# Patient Record
Sex: FEMALE | Race: WHITE | NOT HISPANIC OR LATINO | Employment: FULL TIME | URBAN - METROPOLITAN AREA
[De-identification: names, ages, dates, MRNs, and addresses within clinical notes are randomized per-mention and may not be internally consistent; named-entity substitution may affect disease eponyms.]

---

## 2017-10-30 ENCOUNTER — OFFICE VISIT (OUTPATIENT)
Dept: OBSTETRICS AND GYNECOLOGY | Facility: CLINIC | Age: 37
End: 2017-10-30
Attending: OBSTETRICS & GYNECOLOGY
Payer: COMMERCIAL

## 2017-10-30 VITALS
BODY MASS INDEX: 23.72 KG/M2 | SYSTOLIC BLOOD PRESSURE: 118 MMHG | HEIGHT: 62 IN | DIASTOLIC BLOOD PRESSURE: 76 MMHG | WEIGHT: 128.88 LBS

## 2017-10-30 DIAGNOSIS — Z11.51 SCREENING FOR HPV (HUMAN PAPILLOMAVIRUS): ICD-10-CM

## 2017-10-30 DIAGNOSIS — Z01.419 ENCOUNTER FOR GYNECOLOGICAL EXAMINATION: Primary | ICD-10-CM

## 2017-10-30 DIAGNOSIS — Z12.4 SCREENING FOR CERVICAL CANCER: ICD-10-CM

## 2017-10-30 PROCEDURE — 99999 PR PBB SHADOW E&M-NEW PATIENT-LVL II: CPT | Mod: PBBFAC,,, | Performed by: OBSTETRICS & GYNECOLOGY

## 2017-10-30 PROCEDURE — 99395 PREV VISIT EST AGE 18-39: CPT | Mod: S$GLB,,, | Performed by: OBSTETRICS & GYNECOLOGY

## 2017-10-30 PROCEDURE — 88175 CYTOPATH C/V AUTO FLUID REDO: CPT

## 2017-10-30 PROCEDURE — 87624 HPV HI-RISK TYP POOLED RSLT: CPT

## 2017-10-30 NOTE — PROGRESS NOTES
Subjective:       Patient ID: Becka Carlson is a 37 y.o. female.    Chief Complaint:  Annual Exam (pap 2015 per pt normal )      History of Present Illness.  Becka Carlson is a 37 y.o. female.  She has no breast or urinary symptoms.  She has no menorrhagia, oligomenorrhea, bleeding betweeen menses, postcoital bleeding, dysmenorrhea, pelvic pain, dyspareunia, vaginal dryness, vaginal discharge, or sexual complaints.  She is sexually active and declines STD testing.    GYN & OB History  Patient's last menstrual period was 10/06/2017 (exact date).   Date of Last Pap: No result found      OB History    Para Term  AB Living   0 0 0 0 0 0   SAB TAB Ectopic Multiple Live Births   0 0 0 0 0             Past Medical History:   Diagnosis Date    Anemia      Past Surgical History:   Procedure Laterality Date    TONSILLECTOMY       Family History   Problem Relation Age of Onset    Hypertension Father     Hyperlipidemia Father     Diabetes Father     Stroke Father     Coronary artery disease Father     Heart attack Father     Prostate cancer Father     Hyperlipidemia Mother     Coronary artery disease Paternal Grandmother     Cancer Maternal Grandmother      - lung, smoker    Coronary artery disease Maternal Grandfather     Breast cancer Neg Hx     Colon cancer Neg Hx     Ovarian cancer Neg Hx      Social History   Substance Use Topics    Smoking status: Never Smoker    Smokeless tobacco: Never Used    Alcohol use Yes      Comment: social        Current Outpatient Prescriptions:     multivitamin capsule, Take 1 capsule by mouth once daily., Disp: , Rfl:     Review of patient's allergies indicates:  Allergies not on file    Review of Systems  Review of Systems   Constitutional: Negative for fatigue.   HENT: Negative for trouble swallowing.    Eyes: Negative for visual disturbance.   Respiratory: Negative for cough and shortness of breath.    Cardiovascular: Negative for chest pain.  "  Gastrointestinal: Negative for abdominal distention, abdominal pain, blood in stool, nausea and vomiting.   Genitourinary: Negative for difficulty urinating, dyspareunia, dysuria, flank pain, frequency, hematuria, pelvic pain, urgency, vaginal bleeding, vaginal discharge and vaginal pain.   Musculoskeletal: Negative for arthralgias.   Skin: Negative for rash.   Neurological: Negative for dizziness and headaches.   Psychiatric/Behavioral: Negative for sleep disturbance. The patient is not nervous/anxious.         Objective:     Vitals:    10/30/17 1339   BP: 118/76   Weight: 58.4 kg (128 lb 13.7 oz)   Height: 5' 2" (1.575 m)   PainSc: 0-No pain     Body mass index is 23.57 kg/m².      Physical Exam:   Constitutional: She is oriented to person, place, and time. Vital signs are normal. She appears well-developed and well-nourished.    HENT:   Head: Normocephalic.     Neck: Normal range of motion. No thyromegaly present.     Pulmonary/Chest: Right breast exhibits no mass, no nipple discharge, no skin change, no tenderness and no swelling. Left breast exhibits no mass, no nipple discharge, no skin change, no tenderness and no swelling. Breasts are symmetrical.        Abdominal: Soft. Normal appearance and bowel sounds are normal. She exhibits no distension. There is no tenderness.     Genitourinary: Vagina normal and uterus normal. Pelvic exam was performed with patient supine. There is no rash, tenderness, lesion or injury on the right labia. There is no rash, tenderness, lesion or injury on the left labia. Cervix is normal. Right adnexum displays no mass, no tenderness and no fullness. Left adnexum displays no mass, no tenderness and no fullness. No erythema in the vagina. No vaginal discharge found. Cervix exhibits no motion tenderness and no discharge.           Musculoskeletal: Normal range of motion.      Lymphadenopathy:        Right: No inguinal and no supraclavicular adenopathy present.        Left: No " inguinal and no supraclavicular adenopathy present.    Neurological: She is alert and oriented to person, place, and time.    Skin: Skin is warm and dry.    Psychiatric: She has a normal mood and affect.        Assessment/ Plan:     Encounter for gynecological examination    Screening for cervical cancer  -     Liquid-based pap smear, screening    Screening for HPV (human papillomavirus)  -     HPV DNA probe, amplified        Routine pap smears.  Self breast exam  Diet and exercise discussed.  Contraception reviewed/discussed.  STD testing discussed and declined.  Follow-up with me in 1 year.

## 2017-11-02 ENCOUNTER — TELEPHONE (OUTPATIENT)
Dept: OBSTETRICS AND GYNECOLOGY | Facility: CLINIC | Age: 37
End: 2017-11-02

## 2017-11-02 LAB
HPV16 AG SPEC QL: NEGATIVE
HPV16+18+H RISK 12 DNA CVX-IMP: NEGATIVE
HPV18 DNA SPEC QL NAA+PROBE: NEGATIVE

## 2017-11-02 NOTE — TELEPHONE ENCOUNTER
----- Message from Nikki Lafleur MD sent at 11/2/2017  3:06 PM CDT -----  Call patient and tell her that her Pap smear is normal and I will see her in 1 year for her annual exam.

## 2019-04-04 ENCOUNTER — OFFICE VISIT (OUTPATIENT)
Dept: OBSTETRICS AND GYNECOLOGY | Facility: CLINIC | Age: 39
End: 2019-04-04
Payer: COMMERCIAL

## 2019-04-04 VITALS
HEIGHT: 63 IN | DIASTOLIC BLOOD PRESSURE: 74 MMHG | SYSTOLIC BLOOD PRESSURE: 108 MMHG | WEIGHT: 121.38 LBS | BODY MASS INDEX: 21.51 KG/M2

## 2019-04-04 DIAGNOSIS — Z01.419 ENCOUNTER FOR GYNECOLOGICAL EXAMINATION: Primary | ICD-10-CM

## 2019-04-04 PROCEDURE — 99395 PR PREVENTIVE VISIT,EST,18-39: ICD-10-PCS | Mod: S$GLB,,, | Performed by: OBSTETRICS & GYNECOLOGY

## 2019-04-04 PROCEDURE — 99395 PREV VISIT EST AGE 18-39: CPT | Mod: S$GLB,,, | Performed by: OBSTETRICS & GYNECOLOGY

## 2019-04-04 PROCEDURE — 99999 PR PBB SHADOW E&M-EST. PATIENT-LVL III: CPT | Mod: PBBFAC,,, | Performed by: OBSTETRICS & GYNECOLOGY

## 2019-04-04 PROCEDURE — 99999 PR PBB SHADOW E&M-EST. PATIENT-LVL III: ICD-10-PCS | Mod: PBBFAC,,, | Performed by: OBSTETRICS & GYNECOLOGY

## 2019-04-04 RX ORDER — IVERMECTIN 10 MG/G
CREAM TOPICAL
COMMUNITY

## 2019-04-04 NOTE — PROGRESS NOTES
Subjective:       Patient ID: Becka Carlson is a 38 y.o. female.    Chief Complaint:  No chief complaint on file.      History of Present Illness.  Becka Carlson is a 38 y.o. female.  She has no breast or urinary symptoms.  She has no menorrhagia, oligomenorrhea, bleeding betweeen menses, postcoital bleeding, dysmenorrhea, pelvic pain, dyspareunia, vaginal dryness, vaginal discharge, or sexual complaints.    GYN & OB History  No LMP recorded.   Date of Last Pap: 2017  Normal HPV negative    OB History    Para Term  AB Living   0 0 0 0 0 0   SAB TAB Ectopic Multiple Live Births   0 0 0 0 0       Past Medical History:   Diagnosis Date    Anemia      Past Surgical History:   Procedure Laterality Date    TONSILLECTOMY       Family History   Problem Relation Age of Onset    Hypertension Father     Hyperlipidemia Father     Diabetes Father     Stroke Father     Coronary artery disease Father     Heart attack Father     Prostate cancer Father     Hyperlipidemia Mother     Coronary artery disease Paternal Grandmother     Cancer Maternal Grandmother         - lung, smoker    Coronary artery disease Maternal Grandfather     Breast cancer Neg Hx     Colon cancer Neg Hx     Ovarian cancer Neg Hx      Social History     Tobacco Use    Smoking status: Never Smoker    Smokeless tobacco: Never Used   Substance Use Topics    Alcohol use: Yes     Comment: social     Drug use: No       Current Outpatient Medications:     multivitamin capsule, Take 1 capsule by mouth once daily., Disp: , Rfl:     Review of patient's allergies indicates:  No Known Allergies    Review of Systems  Review of Systems   Constitutional: Negative for fatigue.   HENT: Negative for trouble swallowing.    Eyes: Negative for visual disturbance.   Respiratory: Negative for cough and shortness of breath.    Cardiovascular: Negative for chest pain.   Gastrointestinal: Negative for abdominal distention, abdominal  pain, blood in stool, nausea and vomiting.   Genitourinary: Negative for difficulty urinating, dyspareunia, dysuria, flank pain, frequency, hematuria, pelvic pain, urgency, vaginal bleeding, vaginal discharge and vaginal pain.   Musculoskeletal: Negative for arthralgias.   Skin: Negative for rash.   Neurological: Negative for dizziness and headaches.   Psychiatric/Behavioral: Negative for sleep disturbance. The patient is not nervous/anxious.         Objective:   There were no vitals filed for this visit.  There is no height or weight on file to calculate BMI.      Physical Exam:   Constitutional: She is oriented to person, place, and time. Vital signs are normal. She appears well-developed and well-nourished.    HENT:   Head: Normocephalic.     Neck: Normal range of motion. No thyromegaly present.     Pulmonary/Chest: Right breast exhibits no mass, no nipple discharge, no skin change, no tenderness and no swelling. Left breast exhibits no mass, no nipple discharge, no skin change, no tenderness and no swelling. Breasts are symmetrical.        Abdominal: Soft. Normal appearance and bowel sounds are normal. She exhibits no distension. There is no tenderness.     Genitourinary: Vagina normal and uterus normal. Pelvic exam was performed with patient supine. There is no rash, tenderness, lesion or injury on the right labia. There is no rash, tenderness, lesion or injury on the left labia. Cervix is normal. Right adnexum displays no mass, no tenderness and no fullness. Left adnexum displays no mass, no tenderness and no fullness. No erythema in the vagina. No vaginal discharge found. Cervix exhibits no motion tenderness and no discharge.           Musculoskeletal: Normal range of motion.      Lymphadenopathy:        Right: No inguinal and no supraclavicular adenopathy present.        Left: No inguinal and no supraclavicular adenopathy present.    Neurological: She is alert and oriented to person, place, and time.     Skin: Skin is warm and dry.    Psychiatric: She has a normal mood and affect.        Assessment/ Plan:     Encounter for gynecological examination        Routine pap smears.  Self breast exam  Diet and exercise discussed.  Contraception reviewed/discussed.  Follow-up with me in 1 year.

## 2021-04-12 ENCOUNTER — PATIENT MESSAGE (OUTPATIENT)
Dept: OBSTETRICS AND GYNECOLOGY | Facility: CLINIC | Age: 41
End: 2021-04-12

## 2021-04-12 ENCOUNTER — OFFICE VISIT (OUTPATIENT)
Dept: OBSTETRICS AND GYNECOLOGY | Facility: CLINIC | Age: 41
End: 2021-04-12
Attending: OBSTETRICS & GYNECOLOGY
Payer: COMMERCIAL

## 2021-04-12 VITALS
SYSTOLIC BLOOD PRESSURE: 112 MMHG | DIASTOLIC BLOOD PRESSURE: 72 MMHG | HEIGHT: 63 IN | WEIGHT: 123.13 LBS | BODY MASS INDEX: 21.82 KG/M2

## 2021-04-12 DIAGNOSIS — Z12.31 ENCOUNTER FOR SCREENING MAMMOGRAM FOR BREAST CANCER: ICD-10-CM

## 2021-04-12 DIAGNOSIS — Z01.419 ENCOUNTER FOR GYNECOLOGICAL EXAMINATION: ICD-10-CM

## 2021-04-12 DIAGNOSIS — Z11.51 SCREENING FOR HUMAN PAPILLOMAVIRUS (HPV): ICD-10-CM

## 2021-04-12 DIAGNOSIS — Z12.4 SCREENING FOR MALIGNANT NEOPLASM OF THE CERVIX: Primary | ICD-10-CM

## 2021-04-12 PROCEDURE — 87624 HPV HI-RISK TYP POOLED RSLT: CPT | Performed by: OBSTETRICS & GYNECOLOGY

## 2021-04-12 PROCEDURE — 88175 CYTOPATH C/V AUTO FLUID REDO: CPT | Performed by: OBSTETRICS & GYNECOLOGY

## 2021-04-12 PROCEDURE — 1126F PR PAIN SEVERITY QUANTIFIED, NO PAIN PRESENT: ICD-10-PCS | Mod: S$GLB,,, | Performed by: OBSTETRICS & GYNECOLOGY

## 2021-04-12 PROCEDURE — 1126F AMNT PAIN NOTED NONE PRSNT: CPT | Mod: S$GLB,,, | Performed by: OBSTETRICS & GYNECOLOGY

## 2021-04-12 PROCEDURE — 99396 PREV VISIT EST AGE 40-64: CPT | Mod: S$GLB,,, | Performed by: OBSTETRICS & GYNECOLOGY

## 2021-04-12 PROCEDURE — 3008F PR BODY MASS INDEX (BMI) DOCUMENTED: ICD-10-PCS | Mod: CPTII,S$GLB,, | Performed by: OBSTETRICS & GYNECOLOGY

## 2021-04-12 PROCEDURE — 99999 PR PBB SHADOW E&M-EST. PATIENT-LVL III: ICD-10-PCS | Mod: PBBFAC,,, | Performed by: OBSTETRICS & GYNECOLOGY

## 2021-04-12 PROCEDURE — 99999 PR PBB SHADOW E&M-EST. PATIENT-LVL III: CPT | Mod: PBBFAC,,, | Performed by: OBSTETRICS & GYNECOLOGY

## 2021-04-12 PROCEDURE — 3008F BODY MASS INDEX DOCD: CPT | Mod: CPTII,S$GLB,, | Performed by: OBSTETRICS & GYNECOLOGY

## 2021-04-12 PROCEDURE — 99396 PR PREVENTIVE VISIT,EST,40-64: ICD-10-PCS | Mod: S$GLB,,, | Performed by: OBSTETRICS & GYNECOLOGY

## 2021-04-13 ENCOUNTER — HOSPITAL ENCOUNTER (OUTPATIENT)
Dept: RADIOLOGY | Facility: HOSPITAL | Age: 41
Discharge: HOME | End: 2021-04-13
Attending: OBSTETRICS & GYNECOLOGY
Payer: COMMERCIAL

## 2021-04-13 DIAGNOSIS — Z12.31 ENCOUNTER FOR SCREENING MAMMOGRAM FOR BREAST CANCER: ICD-10-CM

## 2021-04-13 PROCEDURE — 77063 BREAST TOMOSYNTHESIS BI: CPT | Mod: 26,,, | Performed by: RADIOLOGY

## 2021-04-13 PROCEDURE — 77063 MAMMO DIGITAL SCREENING BILAT WITH TOMO: ICD-10-PCS | Mod: 26,,, | Performed by: RADIOLOGY

## 2021-04-13 PROCEDURE — 77067 MAMMO DIGITAL SCREENING BILAT WITH TOMO: ICD-10-PCS | Mod: 26,,, | Performed by: RADIOLOGY

## 2021-04-13 PROCEDURE — 77067 SCR MAMMO BI INCL CAD: CPT | Mod: 26,,, | Performed by: RADIOLOGY

## 2021-04-13 PROCEDURE — 77067 SCR MAMMO BI INCL CAD: CPT | Mod: TC,PO

## 2021-04-14 ENCOUNTER — TELEPHONE (OUTPATIENT)
Dept: RADIOLOGY | Facility: HOSPITAL | Age: 41
End: 2021-04-14

## 2021-04-15 ENCOUNTER — HOSPITAL ENCOUNTER (OUTPATIENT)
Dept: RADIOLOGY | Facility: HOSPITAL | Age: 41
Discharge: HOME | End: 2021-04-15
Attending: OBSTETRICS & GYNECOLOGY
Payer: COMMERCIAL

## 2021-04-15 DIAGNOSIS — R92.8 ABNORMAL MAMMOGRAM: ICD-10-CM

## 2021-04-15 PROCEDURE — 77061 BREAST TOMOSYNTHESIS UNI: CPT | Mod: TC,LT

## 2021-04-15 PROCEDURE — 77061 BREAST TOMOSYNTHESIS UNI: CPT | Mod: 26,LT,, | Performed by: RADIOLOGY

## 2021-04-15 PROCEDURE — 76642 ULTRASOUND BREAST LIMITED: CPT | Mod: TC,LT

## 2021-04-15 PROCEDURE — 76642 US BREAST LEFT LIMITED: ICD-10-PCS | Mod: 26,LT,, | Performed by: RADIOLOGY

## 2021-04-15 PROCEDURE — 76642 ULTRASOUND BREAST LIMITED: CPT | Mod: 26,LT,, | Performed by: RADIOLOGY

## 2021-04-15 PROCEDURE — 77065 DX MAMMO INCL CAD UNI: CPT | Mod: 26,LT,, | Performed by: RADIOLOGY

## 2021-04-15 PROCEDURE — 77061 MAMMO DIGITAL DIAGNOSTIC LEFT WITH TOMO: ICD-10-PCS | Mod: 26,LT,, | Performed by: RADIOLOGY

## 2021-04-15 PROCEDURE — 77065 MAMMO DIGITAL DIAGNOSTIC LEFT WITH TOMO: ICD-10-PCS | Mod: 26,LT,, | Performed by: RADIOLOGY

## 2021-04-16 ENCOUNTER — TELEPHONE (OUTPATIENT)
Dept: RADIOLOGY | Facility: HOSPITAL | Age: 41
End: 2021-04-16

## 2021-04-17 LAB
CLINICAL INFO: NORMAL
CYTO CVX: NORMAL
CYTOLOGIST CVX/VAG CYTO: NORMAL
CYTOLOGY CMNT CVX/VAG CYTO-IMP: NORMAL
CYTOLOGY PAP THIN PREP EXPLANATION: NORMAL
DATE OF PREVIOUS PAP: NORMAL
DATE PREVIOUS BX: NO
HPV I/H RISK 4 DNA CVX QL NAA+PROBE: NOT DETECTED
LMP START DATE: NORMAL
SPECIMEN SOURCE CVX/VAG CYTO: NORMAL
STAT OF ADQ CVX/VAG CYTO-IMP: NORMAL

## 2021-04-19 ENCOUNTER — HOSPITAL ENCOUNTER (OUTPATIENT)
Dept: RADIOLOGY | Facility: HOSPITAL | Age: 41
Discharge: HOME | End: 2021-04-19
Attending: OBSTETRICS & GYNECOLOGY
Payer: COMMERCIAL

## 2021-04-19 DIAGNOSIS — R92.8 ABNORMAL MAMMOGRAM: ICD-10-CM

## 2021-04-19 DIAGNOSIS — R92.8 ABNORMAL MAMMOGRAM: Primary | ICD-10-CM

## 2021-04-19 PROCEDURE — 38505 NEEDLE BIOPSY LYMPH NODES: CPT | Mod: LT,,, | Performed by: RADIOLOGY

## 2021-04-19 PROCEDURE — 77065 DX MAMMO INCL CAD UNI: CPT | Mod: TC,RT

## 2021-04-19 PROCEDURE — 88342 IMHCHEM/IMCYTCHM 1ST ANTB: CPT | Mod: 26,,, | Performed by: PATHOLOGY

## 2021-04-19 PROCEDURE — 88342 CHG IMMUNOCYTOCHEMISTRY: ICD-10-PCS | Mod: 26,,, | Performed by: PATHOLOGY

## 2021-04-19 PROCEDURE — 27201068 MAMMO BREAST STEREOTACTIC BREAST BIOPSY LEFT

## 2021-04-19 PROCEDURE — 88342 IMHCHEM/IMCYTCHM 1ST ANTB: CPT | Mod: 59 | Performed by: PATHOLOGY

## 2021-04-19 PROCEDURE — 25000003 PHARM REV CODE 250: Performed by: OBSTETRICS & GYNECOLOGY

## 2021-04-19 PROCEDURE — 27201068 US BIOPSY LYMPH NODE AXILLA

## 2021-04-19 PROCEDURE — 19081 BX BREAST 1ST LESION STRTCTC: CPT | Mod: LT,,, | Performed by: RADIOLOGY

## 2021-04-19 PROCEDURE — 77065 MAMMO DIGITAL DIAGNOSTIC LEFT: ICD-10-PCS | Mod: 26,LT,, | Performed by: RADIOLOGY

## 2021-04-19 PROCEDURE — 77065 DX MAMMO INCL CAD UNI: CPT | Mod: TC,LT

## 2021-04-19 PROCEDURE — 88305 TISSUE EXAM BY PATHOLOGIST: CPT | Mod: 26,,, | Performed by: PATHOLOGY

## 2021-04-19 PROCEDURE — 77065 DX MAMMO INCL CAD UNI: CPT | Mod: 26,LT,, | Performed by: RADIOLOGY

## 2021-04-19 PROCEDURE — 88305 TISSUE EXAM BY PATHOLOGIST: ICD-10-PCS | Mod: 26,,, | Performed by: PATHOLOGY

## 2021-04-19 PROCEDURE — 19081 MAMMO BREAST STEREOTACTIC BREAST BIOPSY LEFT: ICD-10-PCS | Mod: LT,,, | Performed by: RADIOLOGY

## 2021-04-19 PROCEDURE — 88305 TISSUE EXAM BY PATHOLOGIST: CPT | Mod: 59 | Performed by: PATHOLOGY

## 2021-04-19 PROCEDURE — 38505 US BIOPSY LYMPH NODE AXILLA: ICD-10-PCS | Mod: LT,,, | Performed by: RADIOLOGY

## 2021-04-19 RX ORDER — LIDOCAINE HYDROCHLORIDE 10 MG/ML
3 INJECTION, SOLUTION EPIDURAL; INFILTRATION; INTRACAUDAL; PERINEURAL ONCE
Status: COMPLETED | OUTPATIENT
Start: 2021-04-19 | End: 2021-04-19

## 2021-04-19 RX ORDER — LIDOCAINE HYDROCHLORIDE AND EPINEPHRINE 10; 10 MG/ML; UG/ML
10 INJECTION, SOLUTION INFILTRATION; PERINEURAL ONCE
Status: COMPLETED | OUTPATIENT
Start: 2021-04-19 | End: 2021-04-19

## 2021-04-19 RX ADMIN — LIDOCAINE HYDROCHLORIDE,EPINEPHRINE BITARTRATE 10 ML: 10; .01 INJECTION, SOLUTION INFILTRATION; PERINEURAL at 04:04

## 2021-04-19 RX ADMIN — LIDOCAINE HYDROCHLORIDE,EPINEPHRINE BITARTRATE 30 ML: 10; .01 INJECTION, SOLUTION INFILTRATION; PERINEURAL at 04:04

## 2021-04-19 RX ADMIN — LIDOCAINE HYDROCHLORIDE 6 MG: 10 INJECTION, SOLUTION EPIDURAL; INFILTRATION; INTRACAUDAL; PERINEURAL at 04:04

## 2021-04-19 RX ADMIN — LIDOCAINE HYDROCHLORIDE 3 MG: 10 INJECTION, SOLUTION EPIDURAL; INFILTRATION; INTRACAUDAL; PERINEURAL at 04:04

## 2021-04-23 LAB
FINAL PATHOLOGIC DIAGNOSIS: NORMAL
GROSS: NORMAL
Lab: NORMAL
MICROSCOPIC EXAM: NORMAL

## 2021-04-26 ENCOUNTER — TELEPHONE (OUTPATIENT)
Dept: SURGERY | Facility: CLINIC | Age: 41
End: 2021-04-26

## 2021-04-27 ENCOUNTER — TELEPHONE (OUTPATIENT)
Dept: SURGERY | Facility: CLINIC | Age: 41
End: 2021-04-27

## 2021-10-14 ENCOUNTER — PATIENT MESSAGE (OUTPATIENT)
Dept: OBSTETRICS AND GYNECOLOGY | Facility: CLINIC | Age: 41
End: 2021-10-14

## 2021-10-14 DIAGNOSIS — R92.8 ABNORMAL MAMMOGRAM: Primary | ICD-10-CM

## 2021-10-22 ENCOUNTER — HOSPITAL ENCOUNTER (OUTPATIENT)
Dept: RADIOLOGY | Facility: HOSPITAL | Age: 41
Discharge: HOME | End: 2021-10-22
Attending: OBSTETRICS & GYNECOLOGY
Payer: COMMERCIAL

## 2021-10-22 DIAGNOSIS — R92.8 ABNORMAL MAMMOGRAM: ICD-10-CM

## 2021-10-22 PROCEDURE — 77061 MAMMO DIGITAL DIAGNOSTIC LEFT WITH TOMO: ICD-10-PCS | Mod: 26,LT,, | Performed by: RADIOLOGY

## 2021-10-22 PROCEDURE — 77061 BREAST TOMOSYNTHESIS UNI: CPT | Mod: TC,LT

## 2021-10-22 PROCEDURE — 77061 BREAST TOMOSYNTHESIS UNI: CPT | Mod: 26,LT,, | Performed by: RADIOLOGY

## 2021-10-22 PROCEDURE — 77065 DX MAMMO INCL CAD UNI: CPT | Mod: 26,LT,, | Performed by: RADIOLOGY

## 2021-10-22 PROCEDURE — 77065 MAMMO DIGITAL DIAGNOSTIC LEFT WITH TOMO: ICD-10-PCS | Mod: 26,LT,, | Performed by: RADIOLOGY

## 2022-09-14 PROBLEM — Z01.419 ENCOUNTER FOR WELL WOMAN EXAM: Status: ACTIVE | Noted: 2022-09-14

## 2022-09-16 ENCOUNTER — OFFICE VISIT (OUTPATIENT)
Dept: OBSTETRICS AND GYNECOLOGY | Facility: CLINIC | Age: 42
End: 2022-09-16
Attending: OBSTETRICS & GYNECOLOGY
Payer: COMMERCIAL

## 2022-09-16 VITALS
DIASTOLIC BLOOD PRESSURE: 70 MMHG | HEIGHT: 62 IN | WEIGHT: 122.81 LBS | SYSTOLIC BLOOD PRESSURE: 110 MMHG | BODY MASS INDEX: 22.6 KG/M2

## 2022-09-16 DIAGNOSIS — Z01.419 ENCOUNTER FOR WELL WOMAN EXAM: Primary | ICD-10-CM

## 2022-09-16 PROCEDURE — 1160F RVW MEDS BY RX/DR IN RCRD: CPT | Mod: CPTII,S$GLB,, | Performed by: STUDENT IN AN ORGANIZED HEALTH CARE EDUCATION/TRAINING PROGRAM

## 2022-09-16 PROCEDURE — 99999 PR PBB SHADOW E&M-EST. PATIENT-LVL III: CPT | Mod: PBBFAC,,, | Performed by: STUDENT IN AN ORGANIZED HEALTH CARE EDUCATION/TRAINING PROGRAM

## 2022-09-16 PROCEDURE — 3074F PR MOST RECENT SYSTOLIC BLOOD PRESSURE < 130 MM HG: ICD-10-PCS | Mod: CPTII,S$GLB,, | Performed by: STUDENT IN AN ORGANIZED HEALTH CARE EDUCATION/TRAINING PROGRAM

## 2022-09-16 PROCEDURE — 3078F PR MOST RECENT DIASTOLIC BLOOD PRESSURE < 80 MM HG: ICD-10-PCS | Mod: CPTII,S$GLB,, | Performed by: STUDENT IN AN ORGANIZED HEALTH CARE EDUCATION/TRAINING PROGRAM

## 2022-09-16 PROCEDURE — 3074F SYST BP LT 130 MM HG: CPT | Mod: CPTII,S$GLB,, | Performed by: STUDENT IN AN ORGANIZED HEALTH CARE EDUCATION/TRAINING PROGRAM

## 2022-09-16 PROCEDURE — 1159F PR MEDICATION LIST DOCUMENTED IN MEDICAL RECORD: ICD-10-PCS | Mod: CPTII,S$GLB,, | Performed by: STUDENT IN AN ORGANIZED HEALTH CARE EDUCATION/TRAINING PROGRAM

## 2022-09-16 PROCEDURE — 1159F MED LIST DOCD IN RCRD: CPT | Mod: CPTII,S$GLB,, | Performed by: STUDENT IN AN ORGANIZED HEALTH CARE EDUCATION/TRAINING PROGRAM

## 2022-09-16 PROCEDURE — 99396 PR PREVENTIVE VISIT,EST,40-64: ICD-10-PCS | Mod: S$GLB,,, | Performed by: STUDENT IN AN ORGANIZED HEALTH CARE EDUCATION/TRAINING PROGRAM

## 2022-09-16 PROCEDURE — 1160F PR REVIEW ALL MEDS BY PRESCRIBER/CLIN PHARMACIST DOCUMENTED: ICD-10-PCS | Mod: CPTII,S$GLB,, | Performed by: STUDENT IN AN ORGANIZED HEALTH CARE EDUCATION/TRAINING PROGRAM

## 2022-09-16 PROCEDURE — 3008F BODY MASS INDEX DOCD: CPT | Mod: CPTII,S$GLB,, | Performed by: STUDENT IN AN ORGANIZED HEALTH CARE EDUCATION/TRAINING PROGRAM

## 2022-09-16 PROCEDURE — 3078F DIAST BP <80 MM HG: CPT | Mod: CPTII,S$GLB,, | Performed by: STUDENT IN AN ORGANIZED HEALTH CARE EDUCATION/TRAINING PROGRAM

## 2022-09-16 PROCEDURE — 3008F PR BODY MASS INDEX (BMI) DOCUMENTED: ICD-10-PCS | Mod: CPTII,S$GLB,, | Performed by: STUDENT IN AN ORGANIZED HEALTH CARE EDUCATION/TRAINING PROGRAM

## 2022-09-16 PROCEDURE — 99999 PR PBB SHADOW E&M-EST. PATIENT-LVL III: ICD-10-PCS | Mod: PBBFAC,,, | Performed by: STUDENT IN AN ORGANIZED HEALTH CARE EDUCATION/TRAINING PROGRAM

## 2022-09-16 PROCEDURE — 99396 PREV VISIT EST AGE 40-64: CPT | Mod: S$GLB,,, | Performed by: STUDENT IN AN ORGANIZED HEALTH CARE EDUCATION/TRAINING PROGRAM

## 2022-09-16 NOTE — PROGRESS NOTES
OBSTETRICS AND GYNECOLOGY      Chief Complaint:  Well Woman Exam     HPI:      Becka Carlson is a 42 y.o.  who presents today for well woman exam.  Endorses regular, monthly menses, about every 30 days. Moderate flow. She denies issues, problems, or complaints. Specifically, patient denies abnormal vaginal bleeding, abnormal discharge/odor, pelvic pain, or urinary problems/incontinence. Ms. Carlson is currently sexually active with a single male partner. She is currently using no method for contraception. She declines STD screening today.    Previous Pap (2021):  NILM, HPV(-) (up to date, next due 2024)  Previous Mammogram (10/2021):  BiRADS Category 1, TC 10%; after a BiRADS Category 4 screening (2021) with subsequent biopsy without evidence of malignancy Results for orders placed during the hospital encounter of 21    Mammo Digital Screening Bilat w/ Anand    Narrative  Result:  Mammo Digital Screening Bilat w/ Anand    History:  Patient is 40 y.o. and is seen for a screening mammogram.    Findings:  This procedure was performed using tomosynthesis. Computer-aided detection was utilized in the interpretation of this examination.  The breasts are heterogeneously dense, which may obscure small masses.    Left  There is an asymmetry seen in the upper region of the left breast in the posterior depth on the MLO view.    Right  There is no evidence of suspicious masses, calcifications, or other abnormal findings in the right breast.    Impression  Left  Asymmetry: Left breast asymmetry at the upper posterior position. Assessment: 0 - Incomplete. Diagnostic Mammogram and/or Ultrasound is recommended.    Right  There is no mammographic evidence of malignancy in the right breast.    BI-RADS Category:  Overall: 0 - Incomplete: Needs Additional Imaging Evaluation    Recommendation:  Diagnostic mammogram with possible ultrasound (if indicated) is recommended.    Your estimated lifetime risk of breast  "cancer (to age 85) based on Tyrer-Cuzick risk assessment model is Tyrer-Cuzick: 14.07 %. According to the American Cancer Society, patients with a lifetime breast cancer risk of 20% or higher might benefit from supplemental screening tests.      Gardasil:has never had     Ms. Carlson confirms that she wears her seatbelt when riding in the car and does not text while driving.     OB History          0    Para   0    Term   0       0    AB   0    Living   0         SAB   0    IAB   0    Ectopic   0    Multiple   0    Live Births   0           Obstetric Comments   Menarche 12               ROS:     GENERAL: Feeling well overall.   SKIN: Denies rash or lesions.   HEENT: Denies headaches or vision changes.   CARDIOVASCULAR: Denies palpitations or chest pain.   RESP: Denies shortness of breath.  BREASTS: Denies lumps or nipple discharge.   URINARY: Denies dysuria, hematuria.  NEUROLOGIC: Denies syncope.   PSYCHIATRIC: Denies uncontrolled depression or anxiety.    Physical Exam:      PHYSICAL EXAM:  /70   Ht 5' 2" (1.575 m)   Wt 55.7 kg (122 lb 12.7 oz)   BMI 22.46 kg/m²   Body mass index is 22.46 kg/m².     APPEARANCE:  Well nourished, well developed, in no acute distress.  Able to smile appropriately during our encounter. Makes eye contact. Pleasant.  PSYCH: Appropriate mood and affect.  SKIN:   No acne or hirsutism.  CARDIOVASCULAR:  No edema of peripheral extremities. Well perfused throughout.  RESP:  No accessory muscle use to breathe. Speaking comfortably in complete sentences.   BREASTS:  Symmetrical, with no visible skin lesions or scars, no palpable masses. No nipple discharge or peau d'orange bilaterally. No palpable axillary LAD.  ABDOMEN:  Soft.   PELVIC:  Normal external genitalia without lesions. Normal hair distribution. Adequate perineal body, normal urethral meatus. Vagina moist and well rugated. Without lesions, without discharge. Cervix 3x2cm, small, nulliparous. Cervix pink, " without ectocervical lesions, discharge or tenderness, posterior  No ectropion. No significant cystocele or rectocele.  Bimanual exam shows uterus to be normal size, regular, mobile and nontender.  Adnexa without masses or tenderness.      Assessment/Plan:     Problem List Items Addressed This Visit          Renal/    Encounter for well woman exam - Primary    Overview     Preventive Health Maintenance  -- Continue healthy diet and regular exercise, daily multivitamin, daily seat belt use.   -- Immunizations:  flu - upcoming  -- Pap smear (4/2021):  NILM, HPV(-) (up to date, next due 4/2024)  -- Contraception:  declines; discussed possibility of pregnancy and availability of emergency contraception if needed  -- STD screening - declines   -- BP normotensive  -- MMG (10/2021):  BiRADS Category 1, TC 10%; after a BiRADS Category 4 screening (4/2021) with subsequent biopsy without evidence of malignancy [ORDERED]  -- Colonoscopy (2022):  WNL per patient (done at [facility]), 5yrs repeat (family history of polyps, not malignancy)  -- DEXA screening:  no indication due to age (<64y/o) and no additional risk factors e.g. previous fragility facture, glucocorticoid use, parental history of hip fracture, low body weight (<127 lbs), current cigarette use, excessive alcohol consumption, rheumatoid arthritis, secondary osteoporosis (malabsorption, inflammatory bowel disease, chronic liver disease).              Follow up in about 1 year (around 9/16/2023) for WWE.    Counseling:     Patient was counseled today on current ASCCP pap guidelines, the recommendation for yearly physical exams, safe driving habits, breast self awareness and annual mammograms. She is to see her PCP for other health maintenance.     Use of the [facility] Patient Portal discussed and encouraged during today's visit.                 As of April 1, 2021, the Cures Act has been passed nationally. This new law requires that all doctors progress notes, lab results,  pathology reports and radiology reports be released IMMEDIATELY to the patient in the patient portal. That means that the results are released to you at the EXACT same time they are released to me. Therefore, with all of the patients that I have I am not able to reply to each patient exactly when the results come in. So there will be a delay from when you see the results to when I see them and have time to come up with a response to send you. Also I only see these results when I am on the computer at work. So if the results come in over the weekend or after 5 pm of a work day, I will not see them until the next business day. As you can tell, this is a challenge as a physician to give every patient the quick response they hope for and deserve. So please be patient!   Thanks for your understanding and patience.

## 2022-09-29 ENCOUNTER — HOSPITAL ENCOUNTER (OUTPATIENT)
Dept: RADIOLOGY | Facility: HOSPITAL | Age: 42
Discharge: HOME | End: 2022-09-29
Attending: STUDENT IN AN ORGANIZED HEALTH CARE EDUCATION/TRAINING PROGRAM
Payer: COMMERCIAL

## 2022-09-29 DIAGNOSIS — Z01.419 ENCOUNTER FOR WELL WOMAN EXAM: ICD-10-CM

## 2022-09-29 PROCEDURE — 77063 BREAST TOMOSYNTHESIS BI: CPT | Mod: 26,,, | Performed by: RADIOLOGY

## 2022-09-29 PROCEDURE — 77067 MAMMO DIGITAL SCREENING BILAT WITH TOMO: ICD-10-PCS | Mod: 26,,, | Performed by: RADIOLOGY

## 2022-09-29 PROCEDURE — 77067 SCR MAMMO BI INCL CAD: CPT | Mod: 26,,, | Performed by: RADIOLOGY

## 2022-09-29 PROCEDURE — 77063 MAMMO DIGITAL SCREENING BILAT WITH TOMO: ICD-10-PCS | Mod: 26,,, | Performed by: RADIOLOGY

## 2022-09-29 PROCEDURE — 77067 SCR MAMMO BI INCL CAD: CPT | Mod: TC

## 2022-10-06 ENCOUNTER — HOSPITAL ENCOUNTER (OUTPATIENT)
Dept: RADIOLOGY | Facility: HOSPITAL | Age: 42
Discharge: HOME | End: 2022-10-06
Attending: RADIOLOGY
Payer: COMMERCIAL

## 2022-10-26 ENCOUNTER — OCCUPATIONAL HEALTH (OUTPATIENT)
Dept: URGENT CARE | Facility: CLINIC | Age: 42
End: 2022-10-26

## 2022-10-26 DIAGNOSIS — Z23 NEED FOR INFLUENZA VACCINATION: Primary | ICD-10-CM

## 2022-10-26 PROCEDURE — 90686 IIV4 VACC NO PRSV 0.5 ML IM: CPT | Mod: S$GLB,,, | Performed by: EMERGENCY MEDICINE

## 2022-10-26 PROCEDURE — 90686 PR FLU VACCINE, QIIV4, NO PRSV, 0.5 ML, IM: ICD-10-PCS | Mod: S$GLB,,, | Performed by: EMERGENCY MEDICINE

## 2023-01-27 ENCOUNTER — OFFICE VISIT (OUTPATIENT)
Dept: INTERNAL MEDICINE | Facility: CLINIC | Age: 43
End: 2023-01-27
Payer: COMMERCIAL

## 2023-01-27 ENCOUNTER — TELEPHONE (OUTPATIENT)
Dept: INTERNAL MEDICINE | Facility: CLINIC | Age: 43
End: 2023-01-27

## 2023-01-27 VITALS
SYSTOLIC BLOOD PRESSURE: 100 MMHG | WEIGHT: 123.13 LBS | HEIGHT: 62 IN | HEART RATE: 58 BPM | BODY MASS INDEX: 22.66 KG/M2 | DIASTOLIC BLOOD PRESSURE: 66 MMHG | OXYGEN SATURATION: 99 %

## 2023-01-27 DIAGNOSIS — Z00.00 ANNUAL PHYSICAL EXAM: Primary | ICD-10-CM

## 2023-01-27 DIAGNOSIS — Z23 NEED FOR VACCINATION: ICD-10-CM

## 2023-01-27 DIAGNOSIS — Z11.59 SCREENING FOR VIRAL DISEASE: ICD-10-CM

## 2023-01-27 PROCEDURE — 99999 PR PBB SHADOW E&M-EST. PATIENT-LVL III: CPT | Mod: PBBFAC,,, | Performed by: INTERNAL MEDICINE

## 2023-01-27 PROCEDURE — 3074F SYST BP LT 130 MM HG: CPT | Mod: CPTII,S$GLB,, | Performed by: INTERNAL MEDICINE

## 2023-01-27 PROCEDURE — 3074F PR MOST RECENT SYSTOLIC BLOOD PRESSURE < 130 MM HG: ICD-10-PCS | Mod: CPTII,S$GLB,, | Performed by: INTERNAL MEDICINE

## 2023-01-27 PROCEDURE — 90471 IMMUNIZATION ADMIN: CPT | Mod: S$GLB,,, | Performed by: INTERNAL MEDICINE

## 2023-01-27 PROCEDURE — 99999 PR PBB SHADOW E&M-EST. PATIENT-LVL III: ICD-10-PCS | Mod: PBBFAC,,, | Performed by: INTERNAL MEDICINE

## 2023-01-27 PROCEDURE — 90715 TDAP VACCINE 7 YRS/> IM: CPT | Mod: S$GLB,,, | Performed by: INTERNAL MEDICINE

## 2023-01-27 PROCEDURE — 90715 TDAP VACCINE GREATER THAN OR EQUAL TO 7YO IM: ICD-10-PCS | Mod: S$GLB,,, | Performed by: INTERNAL MEDICINE

## 2023-01-27 PROCEDURE — 1159F PR MEDICATION LIST DOCUMENTED IN MEDICAL RECORD: ICD-10-PCS | Mod: CPTII,S$GLB,, | Performed by: INTERNAL MEDICINE

## 2023-01-27 PROCEDURE — 3008F PR BODY MASS INDEX (BMI) DOCUMENTED: ICD-10-PCS | Mod: CPTII,S$GLB,, | Performed by: INTERNAL MEDICINE

## 2023-01-27 PROCEDURE — 99386 PREV VISIT NEW AGE 40-64: CPT | Mod: 25,S$GLB,, | Performed by: INTERNAL MEDICINE

## 2023-01-27 PROCEDURE — 3078F PR MOST RECENT DIASTOLIC BLOOD PRESSURE < 80 MM HG: ICD-10-PCS | Mod: CPTII,S$GLB,, | Performed by: INTERNAL MEDICINE

## 2023-01-27 PROCEDURE — 90471 TDAP VACCINE GREATER THAN OR EQUAL TO 7YO IM: ICD-10-PCS | Mod: S$GLB,,, | Performed by: INTERNAL MEDICINE

## 2023-01-27 PROCEDURE — 3078F DIAST BP <80 MM HG: CPT | Mod: CPTII,S$GLB,, | Performed by: INTERNAL MEDICINE

## 2023-01-27 PROCEDURE — 3008F BODY MASS INDEX DOCD: CPT | Mod: CPTII,S$GLB,, | Performed by: INTERNAL MEDICINE

## 2023-01-27 PROCEDURE — 99386 PR PREVENTIVE VISIT,NEW,40-64: ICD-10-PCS | Mod: 25,S$GLB,, | Performed by: INTERNAL MEDICINE

## 2023-01-27 PROCEDURE — 1159F MED LIST DOCD IN RCRD: CPT | Mod: CPTII,S$GLB,, | Performed by: INTERNAL MEDICINE

## 2023-01-27 NOTE — PROGRESS NOTES
[facility] Note    Chief Complaint      Chief Complaint   Patient presents with    Establish Care       History of Present Illness      Becka Carlson is a 42 y.o. female with no chronic conditions who presents today for: establish care and annual preventative visit.  Diet: Prepares own food mostly.  Limiting fatty foods and carbs.  Drinks plenty water.  Exercise: Teaches dance 4x/week.    Denies drinking and driving, drinking more than 4 drinks on occasion, drug use.     Flu shot UTD. TdAP due. COVID vaccine UTD, discussed booster.  Shingrix due age 50.  Pneumonia vaccine due age 65.  Mammogram 2022.  PAP smear 2021, Dr. Lafleur.  DEXA due age 65.  Cscope UTD Dr. Rosales, no polyps, 5 yr interval for family hx of polyps.      Past Medical History:  Past Medical History:   Diagnosis Date    Anemia        Past Surgical History:   has a past surgical history that includes Tonsillectomy (1983) and Breast biopsy (Left, 04/19/2021).    Family History:  family history includes Cancer in her father and maternal grandmother; Coronary artery disease in her father, maternal grandfather, and paternal grandmother; Diabetes in her father; Heart attack in her father; Hyperlipidemia in her father and mother; Hypertension in her father; Prostate cancer in her father; Stroke in her father.     Social History:  Social History     Tobacco Use    Smoking status: Never    Smokeless tobacco: Never   Substance Use Topics    Alcohol use: Yes     Alcohol/week: 1.0 standard drink     Types: 1 Drinks containing 0.5 oz of alcohol per week     Comment: social     Drug use: No       I personally reviewed all past medical, surgical, social and family history.    Review of Systems   Constitutional:  Negative for chills, fever and malaise/fatigue.   Respiratory:  Negative for shortness of breath.    Cardiovascular:  Negative for chest pain.   Gastrointestinal:  Negative for constipation, diarrhea, nausea and vomiting.  "  Skin:  Negative for rash.   Neurological:  Negative for weakness.   All other systems reviewed and are negative.     Medications:  Outpatient Encounter Medications as of 1/27/2023   Medication Sig Dispense Refill    ivermectin (SOOLANTRA) 1 % Crea Apply topically.      multivitamin capsule Take 1 capsule by mouth once daily.       No facility-administered encounter medications on file as of 1/27/2023.       Allergies:  Review of patient's allergies indicates:  No Known Allergies    Health Maintenance:  Immunization History   Administered Date(s) Administered    COVID-19, MRNA, LN-S, PF (Pfizer) (Purple Cap) 02/22/2021, 03/15/2021, 11/18/2021    Influenza 10/15/2018    Influenza A (H1N1) 2009 Monovalent - IM 01/05/2010      Health Maintenance   Topic Date Due    Hepatitis C Screening  Never done    Lipid Panel  Never done    TETANUS VACCINE  Never done    Mammogram  09/29/2023        Physical Exam      Vital Signs  Pulse: (!) 58  SpO2: 99 %  BP: 100/66  BP Location: Left arm  Patient Position: Sitting  Pain Score: 0-No pain  Height and Weight  Height: 5' 2" (157.5 cm)  Weight: 55.8 kg (123 lb 2 oz)  BSA (Calculated - sq m): 1.56 sq meters  BMI (Calculated): 22.5  Weight in (lb) to have BMI = 25: 136.4]    Physical Exam  Vitals reviewed.   Constitutional:       Appearance: She is well-developed.   HENT:      Head: Normocephalic and atraumatic.      Right Ear: External ear normal.      Left Ear: External ear normal.   Cardiovascular:      Rate and Rhythm: Normal rate and regular rhythm.      Heart sounds: Normal heart sounds. No murmur heard.  Pulmonary:      Effort: Pulmonary effort is normal.      Breath sounds: Normal breath sounds. No wheezing or rales.   Abdominal:      General: Bowel sounds are normal. There is no distension.      Palpations: Abdomen is soft.      Tenderness: There is no abdominal tenderness.        Laboratory:  CBC:      CMP:        Invalid input(s): CREATININ  URINALYSIS:       LIPIDS:    "   TSH:      A1C:        Assessment/Plan     Becka Carlson is a 42 y.o.female with:    1. Annual physical exam  - CBC Auto Differential; Future  - Comprehensive Metabolic Panel; Future  - Lipid Panel; Future  - TSH; Future  - T4, Free; Future  - Hepatitis C Antibody; Future    2. Screening for viral disease  - Hepatitis C Antibody; Future  Discussed diet and exercise, vaccines and cancer screening, risk factors.  Screening labs ordered.        Chronic conditions status updated as per HPI.  Other than changes above, cont current medications and maintain follow up with specialists.  Follow up in about 1 year (around 1/27/2024) for Annual preventative visit.    No future appointments.    Jake Kendrick MD  [facility]

## 2023-01-30 ENCOUNTER — PATIENT OUTREACH (OUTPATIENT)
Dept: ADMINISTRATIVE | Facility: HOSPITAL | Age: 43
End: 2023-01-30
Payer: COMMERCIAL

## 2023-01-30 NOTE — PROGRESS NOTES
Health Maintenance Due   Topic Date Due    Hepatitis C Screening  Never done    Lipid Panel  Never done    HIV Screening  Never done    COVID-19 Vaccine (4 - Booster for Pfizer series) 01/13/2022    Influenza Vaccine (1) 09/01/2022     Triggered LINKS. Updated Care Everywhere. Imported outside colonoscopy results received from [email or web address]; recommended to repeat in 5 yrs. Chart review completed.

## 2023-02-08 LAB
ALBUMIN SERPL-MCNC: 4 G/DL (ref 3.6–5.1)
ALBUMIN/GLOB SERPL: 1.7 (CALC) (ref 1–2.5)
ALP SERPL-CCNC: 47 U/L (ref 31–125)
ALT SERPL-CCNC: 11 U/L (ref 6–29)
AST SERPL-CCNC: 13 U/L (ref 10–30)
BASOPHILS # BLD AUTO: 40 CELLS/UL (ref 0–200)
BASOPHILS NFR BLD AUTO: 0.8 %
BILIRUB SERPL-MCNC: 0.4 MG/DL (ref 0.2–1.2)
BUN SERPL-MCNC: 10 MG/DL (ref 7–25)
BUN/CREAT SERPL: NORMAL (CALC) (ref 6–22)
CALCIUM SERPL-MCNC: 8.9 MG/DL (ref 8.6–10.2)
CHLORIDE SERPL-SCNC: 108 MMOL/L (ref 98–110)
CHOLEST SERPL-MCNC: 140 MG/DL
CHOLEST/HDLC SERPL: 2 (CALC)
CO2 SERPL-SCNC: 24 MMOL/L (ref 20–32)
CREAT SERPL-MCNC: 0.73 MG/DL (ref 0.5–0.99)
EGFR: 105 ML/MIN/1.73M2
EOSINOPHIL # BLD AUTO: 50 CELLS/UL (ref 15–500)
EOSINOPHIL NFR BLD AUTO: 1 %
ERYTHROCYTE [DISTWIDTH] IN BLOOD BY AUTOMATED COUNT: 11.9 % (ref 11–15)
GLOBULIN SER CALC-MCNC: 2.4 G/DL (CALC) (ref 1.9–3.7)
GLUCOSE SERPL-MCNC: 93 MG/DL (ref 65–99)
HCT VFR BLD AUTO: 35.1 % (ref 35–45)
HCV AB S/CO SERPL IA: 0.05
HCV AB SERPL QL IA: NORMAL
HDLC SERPL-MCNC: 70 MG/DL
HGB BLD-MCNC: 12.1 G/DL (ref 11.7–15.5)
LDLC SERPL CALC-MCNC: 57 MG/DL (CALC)
LYMPHOCYTES # BLD AUTO: 1155 CELLS/UL (ref 850–3900)
LYMPHOCYTES NFR BLD AUTO: 23.1 %
MCH RBC QN AUTO: 30.9 PG (ref 27–33)
MCHC RBC AUTO-ENTMCNC: 34.5 G/DL (ref 32–36)
MCV RBC AUTO: 89.8 FL (ref 80–100)
MONOCYTES # BLD AUTO: 320 CELLS/UL (ref 200–950)
MONOCYTES NFR BLD AUTO: 6.4 %
NEUTROPHILS # BLD AUTO: 3435 CELLS/UL (ref 1500–7800)
NEUTROPHILS NFR BLD AUTO: 68.7 %
NONHDLC SERPL-MCNC: 70 MG/DL (CALC)
PLATELET # BLD AUTO: 241 THOUSAND/UL (ref 140–400)
PMV BLD REES-ECKER: 10.5 FL (ref 7.5–12.5)
POTASSIUM SERPL-SCNC: 4.2 MMOL/L (ref 3.5–5.3)
PROT SERPL-MCNC: 6.4 G/DL (ref 6.1–8.1)
RBC # BLD AUTO: 3.91 MILLION/UL (ref 3.8–5.1)
SODIUM SERPL-SCNC: 139 MMOL/L (ref 135–146)
T4 FREE SERPL-MCNC: 0.9 NG/DL (ref 0.8–1.8)
TRIGL SERPL-MCNC: 54 MG/DL
TSH SERPL-ACNC: 1.59 MIU/L
WBC # BLD AUTO: 5 THOUSAND/UL (ref 3.8–10.8)

## 2023-10-11 ENCOUNTER — PATIENT MESSAGE (OUTPATIENT)
Dept: OBSTETRICS AND GYNECOLOGY | Facility: CLINIC | Age: 43
End: 2023-10-11
Payer: COMMERCIAL

## 2023-10-12 NOTE — TELEPHONE ENCOUNTER
Called pt in regards to the pt portal message.      No answer  Left detailed voice message as well as call back number    ND

## 2023-10-24 ENCOUNTER — OCCUPATIONAL HEALTH (OUTPATIENT)
Dept: URGENT CARE | Facility: CLINIC | Age: 43
End: 2023-10-24

## 2023-10-24 DIAGNOSIS — Z23 ENCOUNTER FOR IMMUNIZATION: Primary | ICD-10-CM

## 2023-10-24 PROCEDURE — 90686 PR FLU VACCINE, QIIV4, NO PRSV, 0.5 ML, IM: ICD-10-PCS | Mod: S$GLB,,, | Performed by: NURSE PRACTITIONER

## 2023-10-24 PROCEDURE — 90686 IIV4 VACC NO PRSV 0.5 ML IM: CPT | Mod: S$GLB,,, | Performed by: NURSE PRACTITIONER

## 2023-11-03 ENCOUNTER — HOSPITAL ENCOUNTER (OUTPATIENT)
Dept: RADIOLOGY | Facility: HOSPITAL | Age: 43
Discharge: HOME | End: 2023-11-03
Attending: INTERNAL MEDICINE
Payer: COMMERCIAL

## 2023-11-03 VITALS — BODY MASS INDEX: 22.15 KG/M2 | HEIGHT: 63 IN | WEIGHT: 125 LBS

## 2023-11-03 DIAGNOSIS — Z12.31 ENCOUNTER FOR SCREENING MAMMOGRAM FOR BREAST CANCER: ICD-10-CM

## 2023-11-03 PROCEDURE — 77067 SCR MAMMO BI INCL CAD: CPT | Mod: 26,,, | Performed by: RADIOLOGY

## 2023-11-03 PROCEDURE — 77067 MAMMO DIGITAL SCREENING BILAT WITH TOMO: ICD-10-PCS | Mod: 26,,, | Performed by: RADIOLOGY

## 2023-11-03 PROCEDURE — 77063 BREAST TOMOSYNTHESIS BI: CPT | Mod: 26,,, | Performed by: RADIOLOGY

## 2023-11-03 PROCEDURE — 77067 SCR MAMMO BI INCL CAD: CPT | Mod: TC

## 2023-11-03 PROCEDURE — 77063 MAMMO DIGITAL SCREENING BILAT WITH TOMO: ICD-10-PCS | Mod: 26,,, | Performed by: RADIOLOGY

## 2023-11-10 ENCOUNTER — TELEPHONE (OUTPATIENT)
Dept: PRIMARY CARE CLINIC | Facility: CLINIC | Age: 43
End: 2023-11-10
Payer: COMMERCIAL

## 2023-11-10 NOTE — TELEPHONE ENCOUNTER
----- Message from [place] sent at 11/10/2023 10:56 AM CST -----  Regarding: pt advice  Contact: pt [phone]  PATIENT CALL    Pt called regarding her most recent mammogram. States that it's been a week and no results are available, so she was starting to worry. Please call back at [phone]

## 2024-03-15 ENCOUNTER — OFFICE VISIT (OUTPATIENT)
Dept: PRIMARY CARE CLINIC | Facility: CLINIC | Age: 44
End: 2024-03-15
Payer: COMMERCIAL

## 2024-03-15 VITALS
OXYGEN SATURATION: 98 % | DIASTOLIC BLOOD PRESSURE: 60 MMHG | HEIGHT: 63 IN | BODY MASS INDEX: 21.61 KG/M2 | WEIGHT: 121.94 LBS | SYSTOLIC BLOOD PRESSURE: 90 MMHG | HEART RATE: 62 BPM

## 2024-03-15 DIAGNOSIS — Z00.00 ANNUAL PHYSICAL EXAM: Primary | ICD-10-CM

## 2024-03-15 PROCEDURE — 3078F DIAST BP <80 MM HG: CPT | Mod: CPTII,S$GLB,, | Performed by: INTERNAL MEDICINE

## 2024-03-15 PROCEDURE — 99396 PREV VISIT EST AGE 40-64: CPT | Mod: S$GLB,,, | Performed by: INTERNAL MEDICINE

## 2024-03-15 PROCEDURE — 3074F SYST BP LT 130 MM HG: CPT | Mod: CPTII,S$GLB,, | Performed by: INTERNAL MEDICINE

## 2024-03-15 PROCEDURE — 99999 PR PBB SHADOW E&M-EST. PATIENT-LVL III: CPT | Mod: PBBFAC,,, | Performed by: INTERNAL MEDICINE

## 2024-03-15 PROCEDURE — 3008F BODY MASS INDEX DOCD: CPT | Mod: CPTII,S$GLB,, | Performed by: INTERNAL MEDICINE

## 2024-03-15 PROCEDURE — 1159F MED LIST DOCD IN RCRD: CPT | Mod: CPTII,S$GLB,, | Performed by: INTERNAL MEDICINE

## 2024-03-15 NOTE — PROGRESS NOTES
[facility] Note    Chief Complaint      Chief Complaint   Patient presents with    Annual Exam       History of Present Illness      History of Present Illness  The patient presents for evaluation of multiple medical concerns.    She is doing good. She is cooking at home and trying not to eat out too much. She is watching her fatty foods and drinking plenty of water. She is still dancing 4 to 5 days a week.   She had a mammogram in 11/2023. She has an appointment with Dr. Dubon for her Pap smear. She had a colonoscopy in 07/2023 and was told to repeat it in 5 years.    Flu shot UTD. TdAP 2023. COVID vaccine UTD, discussed booster.  Shingrix due age 50.  Pneumonia vaccine due age 65.  Mammogram 2023.  PAP smear 2021, Dr. Lafleur.  Will be seeing Dr. Ayoub.  DEXA due age 65.  Cscope UTD 2022 Dr. Rosales, no polyps, 5 yr interval for family hx of polyps.     Past Medical History:  Past Medical History:   Diagnosis Date    Anemia        Past Surgical History:   has a past surgical history that includes Tonsillectomy (1983) and Breast biopsy (Left, 04/19/2021).    Family History:  family history includes Cancer in her father and maternal grandmother; Coronary artery disease in her father, maternal grandfather, and paternal grandmother; Diabetes in her father; Heart attack in her father; Hyperlipidemia in her father and mother; Hypertension in her father; Prostate cancer in her father; Stroke in her father.     Social History:  Social History     Tobacco Use    Smoking status: Never    Smokeless tobacco: Never   Substance Use Topics    Alcohol use: Yes     Alcohol/week: 1.0 standard drink of alcohol     Types: 1 Drinks containing 0.5 oz of alcohol per week     Comment: social     Drug use: No       Medications:  Outpatient Encounter Medications as of 3/15/2024   Medication Sig Dispense Refill    ivermectin (SOOLANTRA) 1 % Crea Apply topically.      multivitamin capsule Take 1 capsule by mouth once daily.  "      No facility-administered encounter medications on file as of 3/15/2024.       Allergies:  Review of patient's allergies indicates:  No Known Allergies    Health Maintenance:  Immunization History   Administered Date(s) Administered    COVID-19, MRNA, LN-S, PF (Pfizer) (Purple Cap) 02/22/2021, 03/15/2021, 11/18/2021    Influenza 10/15/2018, 10/18/2023    Influenza A (H1N1) 2009 Monovalent - IM 01/05/2010    Tdap 01/27/2023      Health Maintenance   Topic Date Due    Mammogram  11/03/2024    TETANUS VACCINE  01/27/2033    Hepatitis C Screening  Completed    Lipid Panel  Completed        Physical Exam      Vital Signs  Pulse: 62  SpO2: 98 %  BP: 90/60  BP Location: Right arm  Patient Position: Sitting  Pain Score: 0-No pain  Height and Weight  Height: 5' 3" (160 cm)  Weight: 55.3 kg (121 lb 14.6 oz)  BSA (Calculated - sq m): 1.57 sq meters  BMI (Calculated): 21.6  Weight in (lb) to have BMI = 25: 140.8]    Physical Exam      Physical Exam  Vitals reviewed.   Constitutional:       Appearance: She is well-developed.   HENT:      Head: Normocephalic and atraumatic.      Right Ear: External ear normal.      Left Ear: External ear normal.   Cardiovascular:      Rate and Rhythm: Normal rate and regular rhythm.      Heart sounds: Normal heart sounds. No murmur heard.  Pulmonary:      Effort: Pulmonary effort is normal.      Breath sounds: Normal breath sounds. No wheezing or rales.   Abdominal:      General: Bowel sounds are normal. There is no distension.      Palpations: Abdomen is soft.      Tenderness: There is no abdominal tenderness.         Laboratory:    Results      CBC:  Recent Labs   Lab 02/07/23  0654   WBC 5.0   RBC 3.91   Hemoglobin 12.1   Hematocrit 35.1   Platelets 241   MCV 89.8   MCH 30.9   MCHC 34.5     CMP:  Recent Labs   Lab 02/07/23  0654   Glucose 93   Calcium 8.9   Albumin 4.0   Total Protein 6.4   Sodium 139   Potassium 4.2   CO2 24   Chloride 108   BUN 10   ALT 11   AST 13   Total Bilirubin 0.4 "     URINALYSIS:       LIPIDS:  Recent Labs   Lab 02/07/23  0654   TSH 1.59   HDL 70   Cholesterol 140   Triglycerides 54   LDL Cholesterol 57   HDL/Cholesterol Ratio 2.0   Non HDL Chol. (LDL+VLDL) 70     TSH:  Recent Labs   Lab 02/07/23  0654   TSH 1.59     A1C:        Assessment/Plan     Becka Carlson is a 43 y.o.female with:    Assessment & Plan  1. Health maintenance.  Her health is perfect. She is not due for anything. I will get the blood work drawn.    She will be due for a colonoscopy in 2027.    Follow-up  She will be due for a bone density scan at age 65.    1. Annual physical exam  - CBC Auto Differential; Future  - Comprehensive Metabolic Panel; Future  - Lipid Panel; Future  - TSH; Future      Chronic conditions status updated as per HPI.  Other than changes above, cont current medications and maintain follow up with specialists.  Follow up in about 1 year (around 3/15/2025) for Annual preventative visit.    Future Appointments   Date Time Provider Department Center   3/25/2024 11:00 AM Becka Ayoub MD [facility]       This note was generated with the assistance of ambient listening technology. Verbal consent was obtained by the patient and accompanying visitor(s) for the recording of patient appointment to facilitate this note. I attest to having reviewed and edited the generated note for accuracy, though some syntax or spelling errors may persist. Please contact the author of this note for any clarification.      Jake Kendrick MD  [facility]

## 2024-04-13 LAB
ALBUMIN SERPL-MCNC: 4 G/DL (ref 3.6–5.1)
ALBUMIN/GLOB SERPL: 1.6 (CALC) (ref 1–2.5)
ALP SERPL-CCNC: 47 U/L (ref 31–125)
ALT SERPL-CCNC: 14 U/L (ref 6–29)
AST SERPL-CCNC: 17 U/L (ref 10–30)
BASOPHILS # BLD AUTO: 40 CELLS/UL (ref 0–200)
BASOPHILS NFR BLD AUTO: 0.9 %
BILIRUB SERPL-MCNC: 0.6 MG/DL (ref 0.2–1.2)
BUN SERPL-MCNC: 10 MG/DL (ref 7–25)
BUN/CREAT SERPL: NORMAL (CALC) (ref 6–22)
CALCIUM SERPL-MCNC: 8.8 MG/DL (ref 8.6–10.2)
CHLORIDE SERPL-SCNC: 105 MMOL/L (ref 98–110)
CHOLEST SERPL-MCNC: 159 MG/DL
CHOLEST/HDLC SERPL: 2.2 (CALC)
CO2 SERPL-SCNC: 26 MMOL/L (ref 20–32)
CREAT SERPL-MCNC: 0.79 MG/DL (ref 0.5–0.99)
EGFR: 95 ML/MIN/1.73M2
EOSINOPHIL # BLD AUTO: 70 CELLS/UL (ref 15–500)
EOSINOPHIL NFR BLD AUTO: 1.6 %
ERYTHROCYTE [DISTWIDTH] IN BLOOD BY AUTOMATED COUNT: 11.7 % (ref 11–15)
GLOBULIN SER CALC-MCNC: 2.5 G/DL (CALC) (ref 1.9–3.7)
GLUCOSE SERPL-MCNC: 85 MG/DL (ref 65–99)
HCT VFR BLD AUTO: 36.5 % (ref 35–45)
HDLC SERPL-MCNC: 71 MG/DL
HGB BLD-MCNC: 12 G/DL (ref 11.7–15.5)
LDLC SERPL CALC-MCNC: 75 MG/DL (CALC)
LYMPHOCYTES # BLD AUTO: 1034 CELLS/UL (ref 850–3900)
LYMPHOCYTES NFR BLD AUTO: 23.5 %
MCH RBC QN AUTO: 29.9 PG (ref 27–33)
MCHC RBC AUTO-ENTMCNC: 32.9 G/DL (ref 32–36)
MCV RBC AUTO: 90.8 FL (ref 80–100)
MONOCYTES # BLD AUTO: 339 CELLS/UL (ref 200–950)
MONOCYTES NFR BLD AUTO: 7.7 %
NEUTROPHILS # BLD AUTO: 2917 CELLS/UL (ref 1500–7800)
NEUTROPHILS NFR BLD AUTO: 66.3 %
NONHDLC SERPL-MCNC: 88 MG/DL (CALC)
PLATELET # BLD AUTO: 260 THOUSAND/UL (ref 140–400)
PMV BLD REES-ECKER: 10.9 FL (ref 7.5–12.5)
POTASSIUM SERPL-SCNC: 4.3 MMOL/L (ref 3.5–5.3)
PROT SERPL-MCNC: 6.5 G/DL (ref 6.1–8.1)
RBC # BLD AUTO: 4.02 MILLION/UL (ref 3.8–5.1)
SODIUM SERPL-SCNC: 137 MMOL/L (ref 135–146)
TRIGL SERPL-MCNC: 47 MG/DL
TSH SERPL-ACNC: 1.48 MIU/L
WBC # BLD AUTO: 4.4 THOUSAND/UL (ref 3.8–10.8)

## 2024-09-06 ENCOUNTER — TELEPHONE (OUTPATIENT)
Dept: OBSTETRICS AND GYNECOLOGY | Facility: CLINIC | Age: 44
End: 2024-09-06
Payer: COMMERCIAL

## 2024-09-06 DIAGNOSIS — Z12.31 SCREENING MAMMOGRAM FOR BREAST CANCER: Primary | ICD-10-CM

## 2024-09-06 NOTE — TELEPHONE ENCOUNTER
Office received message from call center:  Anitra pt  need MMG orders sent to [facility], pt would like a call one order are put in       Previous mmg within pt chart placed by Dr. Lafleur on 10-14-21    Pt has had screening mmg orders performed in 2022 and 2023    Please advise in regards to appropriate order placed for pt to schedule    Thank you  ND

## 2024-09-09 ENCOUNTER — HOSPITAL ENCOUNTER (OUTPATIENT)
Facility: HOSPITAL | Age: 44
Discharge: HOME | End: 2024-09-10
Attending: EMERGENCY MEDICINE | Admitting: EMERGENCY MEDICINE
Payer: COMMERCIAL

## 2024-09-09 ENCOUNTER — HOSPITAL ENCOUNTER (OUTPATIENT)
Dept: RADIOLOGY | Facility: HOSPITAL | Age: 44
Discharge: HOME | End: 2024-09-09
Attending: STUDENT IN AN ORGANIZED HEALTH CARE EDUCATION/TRAINING PROGRAM
Payer: COMMERCIAL

## 2024-09-09 ENCOUNTER — OFFICE VISIT (OUTPATIENT)
Dept: INTERNAL MEDICINE | Facility: CLINIC | Age: 44
End: 2024-09-09
Payer: COMMERCIAL

## 2024-09-09 VITALS
SYSTOLIC BLOOD PRESSURE: 110 MMHG | WEIGHT: 117.06 LBS | TEMPERATURE: 98 F | HEART RATE: 75 BPM | DIASTOLIC BLOOD PRESSURE: 57 MMHG | BODY MASS INDEX: 20.74 KG/M2 | HEIGHT: 63 IN

## 2024-09-09 DIAGNOSIS — J18.9 PNEUMONIA WITH CAVITY OF LUNG: Primary | ICD-10-CM

## 2024-09-09 DIAGNOSIS — R55 SYNCOPE: ICD-10-CM

## 2024-09-09 DIAGNOSIS — Z00.00 ANNUAL PHYSICAL EXAM: Primary | ICD-10-CM

## 2024-09-09 DIAGNOSIS — J98.4 PNEUMONIA WITH CAVITY OF LUNG: Primary | ICD-10-CM

## 2024-09-09 DIAGNOSIS — Z87.898 H/O SYNCOPE: ICD-10-CM

## 2024-09-09 DIAGNOSIS — R07.9 CHEST PAIN: ICD-10-CM

## 2024-09-09 DIAGNOSIS — D75.839 THROMBOCYTOSIS: ICD-10-CM

## 2024-09-09 DIAGNOSIS — D72.825 BANDEMIA: ICD-10-CM

## 2024-09-09 DIAGNOSIS — J98.4 CAVITARY PNEUMONIA: Primary | ICD-10-CM

## 2024-09-09 DIAGNOSIS — J18.9 CAVITARY PNEUMONIA: Primary | ICD-10-CM

## 2024-09-09 DIAGNOSIS — D64.9 NORMOCYTIC ANEMIA: ICD-10-CM

## 2024-09-09 DIAGNOSIS — R07.81 PLEURITIC CHEST PAIN: ICD-10-CM

## 2024-09-09 DIAGNOSIS — Z00.00 ANNUAL PHYSICAL EXAM: ICD-10-CM

## 2024-09-09 LAB
ALBUMIN SERPL BCP-MCNC: 3.3 G/DL (ref 3.5–5.2)
ALLENS TEST: NORMAL
ALP SERPL-CCNC: 99 U/L (ref 55–135)
ALT SERPL W/O P-5'-P-CCNC: 13 U/L (ref 10–44)
ANION GAP SERPL CALC-SCNC: 9 MMOL/L (ref 8–16)
AST SERPL-CCNC: 18 U/L (ref 10–40)
B-HCG UR QL: NEGATIVE
BASOPHILS # BLD AUTO: 0.06 K/UL (ref 0–0.2)
BASOPHILS NFR BLD: 0.3 % (ref 0–1.9)
BILIRUB SERPL-MCNC: 0.2 MG/DL (ref 0.1–1)
BILIRUB UR QL STRIP: NEGATIVE
BUN SERPL-MCNC: 10 MG/DL (ref 6–20)
CALCIUM SERPL-MCNC: 9.6 MG/DL (ref 8.7–10.5)
CHLORIDE SERPL-SCNC: 102 MMOL/L (ref 95–110)
CLARITY UR REFRACT.AUTO: CLEAR
CO2 SERPL-SCNC: 26 MMOL/L (ref 23–29)
COLOR UR AUTO: COLORLESS
CREAT SERPL-MCNC: 0.8 MG/DL (ref 0.5–1.4)
CTP QC/QA: YES
DIFFERENTIAL METHOD BLD: ABNORMAL
EOSINOPHIL # BLD AUTO: 0 K/UL (ref 0–0.5)
EOSINOPHIL NFR BLD: 0.1 % (ref 0–8)
ERYTHROCYTE [DISTWIDTH] IN BLOOD BY AUTOMATED COUNT: 12.1 % (ref 11.5–14.5)
EST. GFR  (NO RACE VARIABLE): >60 ML/MIN/1.73 M^2
GLUCOSE SERPL-MCNC: 116 MG/DL (ref 70–110)
GLUCOSE UR QL STRIP: NEGATIVE
HCT VFR BLD AUTO: 35.3 % (ref 37–48.5)
HCV AB SERPL QL IA: NORMAL
HGB BLD-MCNC: 10.9 G/DL (ref 12–16)
HGB UR QL STRIP: NEGATIVE
HIV 1+2 AB+HIV1 P24 AG SERPL QL IA: NORMAL
IMM GRANULOCYTES # BLD AUTO: 0.09 K/UL (ref 0–0.04)
IMM GRANULOCYTES NFR BLD AUTO: 0.5 % (ref 0–0.5)
KETONES UR QL STRIP: NEGATIVE
LDH SERPL L TO P-CCNC: 0.67 MMOL/L (ref 0.5–2.2)
LDH SERPL L TO P-CCNC: 277 U/L (ref 110–260)
LEUKOCYTE ESTERASE UR QL STRIP: NEGATIVE
LYMPHOCYTES # BLD AUTO: 0.9 K/UL (ref 1–4.8)
LYMPHOCYTES NFR BLD: 4.9 % (ref 18–48)
MAGNESIUM SERPL-MCNC: 2.1 MG/DL (ref 1.6–2.6)
MCH RBC QN AUTO: 28.2 PG (ref 27–31)
MCHC RBC AUTO-ENTMCNC: 30.9 G/DL (ref 32–36)
MCV RBC AUTO: 91 FL (ref 82–98)
MONOCYTES # BLD AUTO: 1 K/UL (ref 0.3–1)
MONOCYTES NFR BLD: 5.5 % (ref 4–15)
NEUTROPHILS # BLD AUTO: 16 K/UL (ref 1.8–7.7)
NEUTROPHILS NFR BLD: 88.7 % (ref 38–73)
NITRITE UR QL STRIP: NEGATIVE
NRBC BLD-RTO: 0 /100 WBC
OHS QRS DURATION: 80 MS
OHS QRS DURATION: 88 MS
OHS QTC CALCULATION: 457 MS
OHS QTC CALCULATION: 465 MS
PH UR STRIP: 7 [PH] (ref 5–8)
PLATELET # BLD AUTO: 555 K/UL (ref 150–450)
PMV BLD AUTO: 9.6 FL (ref 9.2–12.9)
POTASSIUM SERPL-SCNC: 3.8 MMOL/L (ref 3.5–5.1)
PROT SERPL-MCNC: 7.9 G/DL (ref 6–8.4)
PROT UR QL STRIP: NEGATIVE
RBC # BLD AUTO: 3.87 M/UL (ref 4–5.4)
SAMPLE: NORMAL
SARS-COV-2 RDRP RESP QL NAA+PROBE: NEGATIVE
SITE: NORMAL
SODIUM SERPL-SCNC: 137 MMOL/L (ref 136–145)
SP GR UR STRIP: 1.01 (ref 1–1.03)
TROPONIN I SERPL DL<=0.01 NG/ML-MCNC: <0.006 NG/ML (ref 0–0.03)
URN SPEC COLLECT METH UR: ABNORMAL
WBC # BLD AUTO: 18.02 K/UL (ref 3.9–12.7)

## 2024-09-09 PROCEDURE — 86803 HEPATITIS C AB TEST: CPT | Performed by: PHYSICIAN ASSISTANT

## 2024-09-09 PROCEDURE — 96365 THER/PROPH/DIAG IV INF INIT: CPT

## 2024-09-09 PROCEDURE — 87389 HIV-1 AG W/HIV-1&-2 AB AG IA: CPT | Performed by: PHYSICIAN ASSISTANT

## 2024-09-09 PROCEDURE — 93010 ELECTROCARDIOGRAM REPORT: CPT | Mod: ,,, | Performed by: INTERNAL MEDICINE

## 2024-09-09 PROCEDURE — 93005 ELECTROCARDIOGRAM TRACING: CPT

## 2024-09-09 PROCEDURE — 99900035 HC TECH TIME PER 15 MIN (STAT)

## 2024-09-09 PROCEDURE — 25500020 PHARM REV CODE 255: Performed by: EMERGENCY MEDICINE

## 2024-09-09 PROCEDURE — 81025 URINE PREGNANCY TEST: CPT | Performed by: EMERGENCY MEDICINE

## 2024-09-09 PROCEDURE — G0378 HOSPITAL OBSERVATION PER HR: HCPCS

## 2024-09-09 PROCEDURE — 81003 URINALYSIS AUTO W/O SCOPE: CPT | Performed by: EMERGENCY MEDICINE

## 2024-09-09 PROCEDURE — 63600175 PHARM REV CODE 636 W HCPCS

## 2024-09-09 PROCEDURE — 3074F SYST BP LT 130 MM HG: CPT | Mod: CPTII,S$GLB,, | Performed by: STUDENT IN AN ORGANIZED HEALTH CARE EDUCATION/TRAINING PROGRAM

## 2024-09-09 PROCEDURE — 80053 COMPREHEN METABOLIC PANEL: CPT | Mod: 91 | Performed by: EMERGENCY MEDICINE

## 2024-09-09 PROCEDURE — U0002 COVID-19 LAB TEST NON-CDC: HCPCS | Performed by: EMERGENCY MEDICINE

## 2024-09-09 PROCEDURE — 84484 ASSAY OF TROPONIN QUANT: CPT | Performed by: EMERGENCY MEDICINE

## 2024-09-09 PROCEDURE — 96366 THER/PROPH/DIAG IV INF ADDON: CPT

## 2024-09-09 PROCEDURE — 99999 PR PBB SHADOW E&M-EST. PATIENT-LVL IV: CPT | Mod: PBBFAC,,, | Performed by: STUDENT IN AN ORGANIZED HEALTH CARE EDUCATION/TRAINING PROGRAM

## 2024-09-09 PROCEDURE — 63600175 PHARM REV CODE 636 W HCPCS: Performed by: HOSPITALIST

## 2024-09-09 PROCEDURE — 83615 LACTATE (LD) (LDH) ENZYME: CPT | Performed by: EMERGENCY MEDICINE

## 2024-09-09 PROCEDURE — 99285 EMERGENCY DEPT VISIT HI MDM: CPT | Mod: 25

## 2024-09-09 PROCEDURE — 25000003 PHARM REV CODE 250

## 2024-09-09 PROCEDURE — 3078F DIAST BP <80 MM HG: CPT | Mod: CPTII,S$GLB,, | Performed by: STUDENT IN AN ORGANIZED HEALTH CARE EDUCATION/TRAINING PROGRAM

## 2024-09-09 PROCEDURE — 87102 FUNGUS ISOLATION CULTURE: CPT

## 2024-09-09 PROCEDURE — 87040 BLOOD CULTURE FOR BACTERIA: CPT | Mod: 59 | Performed by: EMERGENCY MEDICINE

## 2024-09-09 PROCEDURE — 85025 COMPLETE CBC W/AUTO DIFF WBC: CPT | Mod: 91 | Performed by: EMERGENCY MEDICINE

## 2024-09-09 PROCEDURE — 1160F RVW MEDS BY RX/DR IN RCRD: CPT | Mod: CPTII,S$GLB,, | Performed by: STUDENT IN AN ORGANIZED HEALTH CARE EDUCATION/TRAINING PROGRAM

## 2024-09-09 PROCEDURE — 87205 SMEAR GRAM STAIN: CPT

## 2024-09-09 PROCEDURE — 96376 TX/PRO/DX INJ SAME DRUG ADON: CPT | Mod: 59

## 2024-09-09 PROCEDURE — 3008F BODY MASS INDEX DOCD: CPT | Mod: CPTII,S$GLB,, | Performed by: STUDENT IN AN ORGANIZED HEALTH CARE EDUCATION/TRAINING PROGRAM

## 2024-09-09 PROCEDURE — 87106 FUNGI IDENTIFICATION YEAST: CPT

## 2024-09-09 PROCEDURE — 99214 OFFICE O/P EST MOD 30 MIN: CPT | Mod: S$GLB,,, | Performed by: STUDENT IN AN ORGANIZED HEALTH CARE EDUCATION/TRAINING PROGRAM

## 2024-09-09 PROCEDURE — 71046 X-RAY EXAM CHEST 2 VIEWS: CPT | Mod: TC

## 2024-09-09 PROCEDURE — 96375 TX/PRO/DX INJ NEW DRUG ADDON: CPT | Mod: 59

## 2024-09-09 PROCEDURE — 87206 SMEAR FLUORESCENT/ACID STAI: CPT | Performed by: EMERGENCY MEDICINE

## 2024-09-09 PROCEDURE — 87015 SPECIMEN INFECT AGNT CONCNTJ: CPT | Performed by: EMERGENCY MEDICINE

## 2024-09-09 PROCEDURE — 87116 MYCOBACTERIA CULTURE: CPT | Performed by: EMERGENCY MEDICINE

## 2024-09-09 PROCEDURE — 25000003 PHARM REV CODE 250: Performed by: HOSPITALIST

## 2024-09-09 PROCEDURE — 1159F MED LIST DOCD IN RCRD: CPT | Mod: CPTII,S$GLB,, | Performed by: STUDENT IN AN ORGANIZED HEALTH CARE EDUCATION/TRAINING PROGRAM

## 2024-09-09 PROCEDURE — 83735 ASSAY OF MAGNESIUM: CPT | Performed by: EMERGENCY MEDICINE

## 2024-09-09 PROCEDURE — 99204 OFFICE O/P NEW MOD 45 MIN: CPT | Mod: ,,, | Performed by: INTERNAL MEDICINE

## 2024-09-09 PROCEDURE — 83605 ASSAY OF LACTIC ACID: CPT

## 2024-09-09 PROCEDURE — 71046 X-RAY EXAM CHEST 2 VIEWS: CPT | Mod: 26,,, | Performed by: RADIOLOGY

## 2024-09-09 PROCEDURE — 87070 CULTURE OTHR SPECIMN AEROBIC: CPT | Mod: 59

## 2024-09-09 RX ORDER — IBUPROFEN 200 MG
24 TABLET ORAL
Status: DISCONTINUED | OUTPATIENT
Start: 2024-09-09 | End: 2024-09-10 | Stop reason: HOSPADM

## 2024-09-09 RX ORDER — IVERMECTIN 10 MG/G
CREAM TOPICAL EVERY OTHER DAY
Status: ON HOLD | COMMUNITY
End: 2024-09-10 | Stop reason: HOSPADM

## 2024-09-09 RX ORDER — SODIUM CHLORIDE 0.9 % (FLUSH) 0.9 %
10 SYRINGE (ML) INJECTION EVERY 12 HOURS PRN
Status: DISCONTINUED | OUTPATIENT
Start: 2024-09-09 | End: 2024-09-10 | Stop reason: HOSPADM

## 2024-09-09 RX ORDER — DICLOFENAC SODIUM 10 MG/G
2 GEL TOPICAL DAILY PRN
Status: ON HOLD | COMMUNITY
End: 2024-09-10 | Stop reason: HOSPADM

## 2024-09-09 RX ORDER — DEXTROMETHORPHAN HYDROBROMIDE, GUAIFENESIN 5; 100 MG/5ML; MG/5ML
650 LIQUID ORAL DAILY PRN
Status: ON HOLD | COMMUNITY
End: 2024-09-10 | Stop reason: HOSPADM

## 2024-09-09 RX ORDER — NALOXONE HCL 0.4 MG/ML
0.02 VIAL (ML) INJECTION
Status: DISCONTINUED | OUTPATIENT
Start: 2024-09-09 | End: 2024-09-10 | Stop reason: HOSPADM

## 2024-09-09 RX ORDER — GLUCAGON 1 MG
1 KIT INJECTION
Status: DISCONTINUED | OUTPATIENT
Start: 2024-09-09 | End: 2024-09-10 | Stop reason: HOSPADM

## 2024-09-09 RX ORDER — IBUPROFEN 200 MG
16 TABLET ORAL
Status: DISCONTINUED | OUTPATIENT
Start: 2024-09-09 | End: 2024-09-10 | Stop reason: HOSPADM

## 2024-09-09 RX ADMIN — IOHEXOL 75 ML: 350 INJECTION, SOLUTION INTRAVENOUS at 02:09

## 2024-09-09 RX ADMIN — PIPERACILLIN SODIUM AND TAZOBACTAM SODIUM 4.5 G: 4; .5 INJECTION, POWDER, FOR SOLUTION INTRAVENOUS at 07:09

## 2024-09-09 NOTE — ASSESSMENT & PLAN NOTE
Most recent hemoglobin and hematocrit are listed below.  Recent Labs     09/09/24  1031 09/09/24  1258   HGB 10.7* 10.9*   HCT 32.9* 35.3*     Plan  - Stable, chronic. Restarted outpatient multivitamin. Recommend outpatient f/u.

## 2024-09-09 NOTE — ASSESSMENT & PLAN NOTE
Lab Results   Component Value Date    WBC 18.26 (H) 09/09/2024    HGB 10.7 (L) 09/09/2024    HCT 32.9 (L) 09/09/2024    MCV 90 09/09/2024     (H) 09/09/2024       I am referring patient to the ER for evaluation, additional labs, lactate, cultures.

## 2024-09-09 NOTE — PROGRESS NOTES
[facility] - Emergency Dept  Hospital Medicine  Progress Note    Patient Name: Becka Carlson  MRN: [id number]  Patient Class: OP- Observation   Admission Date: 9/9/2024  Length of Stay: 0 days  Attending Physician: Liz Esparza MD  Primary Care Provider: Jake Kendrick MD        Subjective:     Principal Problem:<principal problem not specified>        HPI:  This morning she woke up on the floor. Last thing she remembered was putting a towel on after showering. When she woke up on the ground she woke up she was still had wet towels on from the shower.    Patient reports that for 3 weeks ago, she had an upper respiratory symptoms and malaise, which then progressed to a cough. Sputum production with pinkish phlegm that has now changed to yellowish phlegm. OTC pseudofed provided relief. Cough has improved in frequency.     Reports heaviness in the head, low grade fevers for the last week (100.5 max), night sweats, localized pain on L posterior mid-chest (first time this AM).     Went to [facility] [employer] and returned last Tuesday after 1 week trip. Hike for 4 days straight, which involved lakes, caves (crawling through) and glaciers. After caving she threw up NB emesis. Vibratory sensation while throwing up. Denies noticing any bats or lizards while traveling. Partner was on the travel as well; feels a little malaise.     Cave on 08/30. Denies chronic nasal congestion, rash, weight loss, abdominal pain.  DAD [employer].  Grandma with lung cancer from heavy cig.    Steroid injection in the knee this week bilaterally. Saturday PO doxy 2 doses.      Overview/Hospital Course:  No notes on file    Past Medical History:   Diagnosis Date    Anemia        Past Surgical History:   Procedure Laterality Date    BREAST BIOPSY Left 04/19/2021    TONSILLECTOMY  1983       Review of patient's allergies indicates:  No Known Allergies    No current facility-administered medications on file prior to encounter.      Current Outpatient Medications on File Prior to Encounter   Medication Sig    multivitamin capsule Take 1 capsule by mouth once daily.    [DISCONTINUED] ivermectin (SOOLANTRA) 1 % Crea Apply topically.     Family History       Problem Relation (Age of Onset)    Cancer Father, Maternal Grandmother    Coronary artery disease Father, Paternal Grandmother, Maternal Grandfather    Diabetes Father    Heart attack Father    Hyperlipidemia Father, Mother    Hypertension Father    Prostate cancer Father    Stroke Father          Tobacco Use    Smoking status: Never    Smokeless tobacco: Never   Substance and Sexual Activity    Alcohol use: Yes     Alcohol/week: 1.0 standard drink of alcohol     Types: 1 Drinks containing 0.5 oz of alcohol per week     Comment: social     Drug use: No    Sexual activity: Yes     Partners: Male     Birth control/protection: None     Review of Systems   Constitutional:  Positive for diaphoresis (Night sweats), fatigue and fever.   HENT: Negative.     Eyes: Negative.    Respiratory:  Positive for cough. Negative for shortness of breath.    Cardiovascular:  Positive for chest pain (Pleuritic a/w cough).   Gastrointestinal: Negative.    Endocrine: Negative.    Genitourinary: Negative.    Musculoskeletal:  Positive for myalgias (One event myalgias.).   Skin:  Positive for rash (Minor erythematous, poorly defined splotch on right knee).   Allergic/Immunologic: Negative.    Neurological:  Positive for syncope (Apparent syncopal episode w/ classic prodrome).   Hematological: Negative.    Psychiatric/Behavioral: Negative.       Objective:     Vital Signs (Most Recent):  Temp: 98.2 °F (36.8 °C) (09/09/24 1402)  Pulse: 76 (09/09/24 1418)  Resp: 18 (09/09/24 1307)  BP: (!) 116/58 (09/09/24 1418)  SpO2: 99 % (09/09/24 1418) Vital Signs (24h Range):  Temp:  [97.9 °F (36.6 °C)-98.3 °F (36.8 °C)] 98.2 °F (36.8 °C)  Pulse:  [75-82] 76  Resp:  [18] 18  SpO2:  [99 %-100 %] 99 %  BP: (102-116)/(55-60) 116/58      Weight: 55.3 kg (121 lb 14.6 oz)  Body mass index is 21.6 kg/m².     Physical Exam           Significant Labs: All pertinent labs within the past 24 hours have been reviewed.  Recent Lab Results  (Last 5 results in the past 24 hours)        09/09/24  1423   09/09/24  1412   09/09/24  1411   09/09/24  1337   09/09/24  1300        Allens Test         N/A       Appearance, UA     Clear           Bilirubin (UA)     Negative           Site         Other       Color, UA     Colorless           Glucose, UA     Negative           Hepatitis C Ab Non-reactive               HIV 1/2 Ag/Ab Non-reactive               Ketones, UA     Negative           Lactate Dehydrogenase 277  Comment: Results are increased in hemolyzed samples.  *Result may be interfered by visible hemolysis                 Leukocyte Esterase, UA     Negative           NITRITE UA     Negative           Blood, UA     Negative           QRS Duration       88         [facility] QTC Calculation       465         pH, UA     7.0           POC Lactate         0.67       hCG Qualitative, Urine     Negative           Protein, UA     Negative  Comment: Recommend a 24 hour urine protein or a urine   protein/creatinine ratio if globulin induced proteinuria is  clinically suspected.              Acceptable     Yes           Sample         VENOUS       SARS-CoV-2 RNA, Amplification, Qual   Negative  Comment: This test utilizes isothermal nucleic acid amplification technology   to   detect the SARS-CoV-2 RdRp nucleic acid segment. The analytical   sensitivity   (limit of detection) is 500 copies/swab.    A POSITIVE result is indicative of the presence of SARS-CoV-2 RNA;   clinical   correlation with patient history and other diagnostic information is   necessary to determine patient infection status.    A NEGATIVE result means that SARS-CoV-2 nucleic acids are not present   above   the limit of detection. A NEGATIVE result should be treated as   presumptive.   It  does not rule out the possibility of COVID-19 and should not be   the sole   basis for treatment decisions.    If COVID-19 is strongly suspected based on clinical and exposure   history,   re-testing using an alternate molecular assay should be considered.    This test is Food and Drug Administration (FDA) approved. Performance   characteristics of this has been independently verified by [facility] Department of Pathology and Laboratory Medicine.               Spec Grav UA     1.010           Specimen UA     Urine, Clean Catch                                  Significant Imaging: I have reviewed all pertinent imaging results/findings within the past 24 hours.    Assessment/Plan:      Pneumonia with cavity of lung  Pleuritic chest pain.  Bandemia.  Thrombocytosis (reactive).      Patient has a diagnosis of pneumonia. The cause of the pneumonia is suspected to be bacterial in etiology but organism is not known. The pneumonia is stable. The patient has the following signs/symptoms of pneumonia: cough, sputum production, and chest pain. The patient does not have a current oxygen requirement and the patient does not have a home oxygen requirement. I have reviewed the pertinent imaging. The following cultures have been collected: Blood cultures and Sputum culture.    PLAN:    - Pulmonology consulted given cavitary lung lesion in setting of multiple uncommon exposures.   - Etiology of cavitary lesion likely post-viral 2/2 recent bout of URI symptoms, potentially covid.   - IID consulted given cavitary lung lesion in setting of multiple uncommon exposures.  - Blood cultures, TB testing, sputum cultures all pending.   - Begin course IV abx (vanc/zosyn) in-patient with expected discharge tomorrow on 4-6 weeks of outpatient doxycycline+augmentin. Potential out-patient abx recs change pending culture results.    Normocytic anemia  Most recent hemoglobin and hematocrit are listed below.  Recent Labs      09/09/24  1031 09/09/24  1258   HGB 10.7* 10.9*   HCT 32.9* 35.3*     Plan  - Stable, chronic. Restarted outpatient multivitamin. Recommend outpatient f/u.      VTE Risk Mitigation (From admission, onward)      None            Discharge Planning   DANIEL:      Code Status: Not on file   Is the patient medically ready for discharge?:     Reason for patient still in hospital (select all that apply): Patient trending condition                     Joe Garber MD  Department of Hospital Medicine   [facility] - Emergency Dept

## 2024-09-09 NOTE — CONSULTS
[facility] Pul[facility] Medicine Consult     Primary Attending:  Liz Esparza MD   Consultant Attending: Talia Mckeon MD   Consultant Fellow: Daron Bustos, [id number]     2024   0       Chief Complaint/Reason for Consult      Cavitary lesion of the lung     History of Present Illness      Mrs. Carlson is a 44-year-old woman with a history of anemia, currently not on any medications other than multivitamins who is presenting from clinic with an abnormal chest x-ray showing a large cavitary lesion.  She has had 3 weeks of fatigue and cough. She was exposed to multiple family members with covid. She took a home test that was negative. She was feeling ill on her trip to [facility]. She started to feel somewhat better then once she arrived home she started feeling worse again. She began having fever and left back pain. She was started on doxycycline by a neighbor/physician and took 2-3 pills. She saw her doctor because she had coughed up pink sputum which concerned her and the xray was taken that showed the cavitation and she was sent to the ED.     On arrival to ED she was AF, HDS, HR 82, SPO2 100% RA. WBC count 18 (mostly granulocytes), remainder of labwork is unremarkable, she had a baseline anemia. CT Chest showed 2g0n2bp cavitary lesion of the LLL for which pulmonary was consulted.     She is a non smoker, no vaping. She has not staying in a group home or been incarcerated, no international travel, no known TB contacts. No h/o autoimmune disease, no joint issues or rashes or oral ulcers. No chronic congestive issues. No other known pulmonary problems. No unintentional weight loss.   She does have GM who  of lung cancer in her 70s but she was a lifelong smoker.      Review of Systems      Negative except as above.      Medications and nursing orders have been reviewed      PMHx     Past Medical History:   Diagnosis Date    Anemia        MEDS     Prior to Admission medications    Medication Sig Start Date  "End Date Taking? Authorizing Provider   multivitamin capsule Take 1 capsule by mouth once daily.   Yes Provider, Historical   ivermectin (SOOLANTRA) 1 % Crea Apply topically.  9/9/24  Provider, Historical       ALL     Review of patient's allergies indicates:  No Known Allergies    PSHx     Past Surgical History:   Procedure Laterality Date    BREAST BIOPSY Left 04/19/2021    TONSILLECTOMY  1983       FHx     Family History   Problem Relation Name Age of Onset    Hypertension Father Stephan     Hyperlipidemia Father Stephan     Diabetes Father Stephan     Stroke Father Stephan     Coronary artery disease Father Stephan     Heart attack Father Stephan     Prostate cancer Father Stephan     Cancer Father Stephan     Hyperlipidemia Mother Laura     Coronary artery disease Paternal Grandmother      Cancer Maternal Grandmother [place]         - lung, smoker    Coronary artery disease Maternal Grandfather      Breast cancer Neg Hx      Colon cancer Neg Hx      Ovarian cancer Neg Hx         SHx     Social History     Tobacco Use    Smoking status: Never    Smokeless tobacco: Never   Substance Use Topics    Alcohol use: Yes     Alcohol/week: 1.0 standard drink of alcohol     Types: 1 Drinks containing 0.5 oz of alcohol per week     Comment: social     Drug use: No         On Examination      Vital Signs:  Temp:  [97.9 °F (36.6 °C)-98.3 °F (36.8 °C)]   Pulse:  [75-82]   Resp:  [18]   BP: (102-116)/(55-60)   SpO2:  [99 %-100 %]     No results found for: "HTIN", "WEIGHT"    Intake / Output:  No intake or output data in the 24 hours ending 09/09/24 1652    I have reviewed the vital trends as well as the documented I/Os     Physical Exam:  GEN: non-toxic appearing  HEENT: MMM, no scleral icterus, EOMI  NECK: Supple, midline trachea, no raised JVP or a-waves notable  CV: RRR, no MRG, pulses equal and symmetric 2+ at radial  PULM: CTAB  ABDOMEN: Soft, non-tender, non-distended, no rebound or guarding  SKIN: Warm, dry, intact, no " "rashes  MSK: No deformity, no clubbing, cyanosis, or lower extremity edema  NEURO: no FND, AAOx3    All recent labs and imaging studies have been reviewed    Recent Labs   Lab 09/09/24  1031 09/09/24  1258   WBC 18.26* 18.02*   HGB 10.7* 10.9*   HCT 32.9* 35.3*   * 555*       Recent Labs   Lab 09/09/24  1031 09/09/24  1258    137   K 4.0 3.8    102   CO2 26 26   BUN 10 10   CREATININE 0.8 0.8   * 116*   CALCIUM 9.3 9.6   MG  --  2.1       Recent Labs   Lab 09/09/24  1031 09/09/24  1258   PROT 7.4 7.9   ALBUMIN 3.1* 3.3*   BILITOT 0.3 0.2   AST 14 18   ALT 13 13   ALKPHOS 91 99       No results for input(s): "PROTIME", "PTT", "INR" in the last 168 hours.    Cardiac:   Recent Labs   Lab 09/09/24  1258   TROPONINI <0.006       FLP:   Lab Results   Component Value Date    CHOL 159 04/12/2024    HDL 71 04/12/2024    LDLCALC 75 04/12/2024    TRIG 47 04/12/2024    CHOLHDL 2.2 04/12/2024     DM:   Lab Results   Component Value Date    LDLCALC 75 04/12/2024    CREATININE 0.8 09/09/2024     Thyroid:   Lab Results   Component Value Date    TSH 1.48 04/12/2024     Anemia: No results found for: "IRON", "TIBC", "FERRITIN", "[id number]", "FOLATE"  Urinalysis:   Lab Results   Component Value Date    COLORU Colorless (A) 09/09/2024    SPECGRAV 1.010 09/09/2024    NITRITE Negative 09/09/2024    KETONESU Negative 09/09/2024       Pertinent findings include:    Lab Results   Component Value Date    .3 (H) 09/09/2024    [id number] Negative 09/09/2024          Microbiology:  Microbiology Results (last 7 days)       Procedure Component Value Units Date/Time    Culture, Respiratory with Gram Stain [[id number]]     Order Status: No result Specimen: Respiratory     AFB Culture & Smear (Collect Today) [[id number]]     Order Status: No result Specimen: Respiratory from Sputum, Induced     Blood culture x two cultures. Draw prior to antibiotics. [[id number]] Collected: 09/09/24 1258    Order Status: Sent " Specimen: Blood from Peripheral, Antecubital, Right Updated: 09/09/24 1313    Blood culture x two cultures. Draw prior to antibiotics. [[id number]] Collected: 09/09/24 1258    Order Status: Sent Specimen: Blood from Peripheral, Antecubital, Left Updated: 09/09/24 1313              Radiology:  CT chest with large LL cavitary lesion without almost no surrounding opacification. The lesion is abutting the pleura, no pleural effusions.     I have personally and independently interpretted the following tests:    Labs / Images / Cultures     Assessment / Plan:     Becka Carlson is a 44 F with little PMH presenting with LLL cavitary PNA in the setting of recent viral illness. Likely strep or staff. I suspect the lack of surrounding opacification is related to her abx use. Her back pain is d/t pleuritic inflammation from the pneumonia. I have extremely low suspicion she has MTB or another mycobacterial or atypical form of cavitary pneumonia  Clinically she looks great and would like to go home ASAP.     Recommendations:  In my opinion, she does not need TB r/o and can be discharged with a long course of Abx, would recommend doxycycline and Augmentin for  4-6 weeks based on repeat imaging and clinical course.   Obtain MRSA nasal PCR to determine if MRSA coverage can be dropped.   I have set her up with pulmonary follow up in the [facility] for 9/20/24. Timing of repeat imaging can be determine at that visit.  I counseled her on risk of developing a pleural effusion and if she develops worsening pain, shortness or breath or overall decline to return to the ED.   Counseled her that this is extremely unlikely to be malignancy as that was one of her concerns and that we will repeat imaging in the future.   Management of her pleuritic pain per primary.     I have communicated my assessment and recommendations with the primary team.   Will sign off. Thank you for allowing us to participate in the care  of Mrs. Carlson.     Please call or message directly through EPIC with any additional questions or concerns     Daron Bustos MD  Pulmonary & Critical Care Fellow  [phone]

## 2024-09-09 NOTE — ASSESSMENT & PLAN NOTE
Pleuritic chest pain.  Bandemia.  Thrombocytosis (reactive).      Patient has a diagnosis of pneumonia. The cause of the pneumonia is suspected to be bacterial in etiology but organism is not known. The pneumonia is stable. The patient has the following signs/symptoms of pneumonia: cough, sputum production, and chest pain. The patient does not have a current oxygen requirement and the patient does not have a home oxygen requirement. I have reviewed the pertinent imaging. The following cultures have been collected: Blood cultures and Sputum culture.    PLAN:    - Pulmonology consulted given cavitary lung lesion in setting of multiple uncommon exposures. Recs as below.   - Etiology of cavitary lesion likely post-viral 2/2 recent bout of URI symptoms, potentially covid.   - In my opinion, she does not need TB r/o and can be discharged with a long course of Abx, would recommend doxycycline and Augmentin for  4-6 weeks based on repeat imaging and clinical course.   - Obtain MRSA nasal PCR to determine if MRSA coverage can be dropped.   - I have set her up with pulmonary follow up in the [facility] for 9/20/24. Timing of repeat imaging can be determine at that visit.  - I counseled her on risk of developing a pleural effusion and if she develops worsening pain, shortness or breath or overall decline to return to the ED.   -Counseled her that this is extremely unlikely to be malignancy as that was one of her concerns and that we will repeat imaging in the future.   -Management of her pleuritic pain per primary.     - IID consulted given cavitary lung lesion in setting of multiple uncommon exposures.  - Blood cultures, TB testing, sputum cultures all pending.   - Begin course IV abx (vanc/zosyn) in-patient with expected discharge tomorrow on 4-6 weeks of outpatient doxycycline+augmentin. Potential out-patient abx recs change pending culture results.

## 2024-09-09 NOTE — ASSESSMENT & PLAN NOTE
New episode, 3 to 5 min  Possibly vasomotor response, hypotension?  I am referring patient to the ER for evaluation.

## 2024-09-09 NOTE — HPI
This morning she woke up on the floor. Last thing she remembered was putting a towel on after showering. When she woke up on the ground she woke up she was still had wet towels on from the shower.    Patient reports that for 3 weeks ago, she had an upper respiratory symptoms and malaise, which then progressed to a cough. Sputum production with pinkish phlegm that has now changed to yellowish phlegm. OTC pseudofed provided relief. Cough has improved in frequency.     Reports heaviness in the head, low grade fevers for the last week (100.5 max), night sweats, localized pain on L posterior mid-chest (first time this AM).     Went to [facility] [address] [place] and returned last Tuesday after 1 week trip. Hike for 4 days straight, which involved lakes, caves (crawling through) and glaciers. After caving she threw up NB emesis. Vibratory sensation while throwing up. Denies noticing any bats or lizards while traveling. Partner was on the travel as well; feels a little malaise.     Cave on 08/30. Denies chronic nasal congestion, rash, weight loss, abdominal pain.  DAD [employer].  Grandma with lung cancer from heavy cig.    Steroid injection in the knee this week bilaterally. Saturday PO doxy 2 doses.

## 2024-09-09 NOTE — ASSESSMENT & PLAN NOTE
Pleuritic chest pain.  Bandemia.  Thrombocytosis (reactive).      Patient has a diagnosis of pneumonia. The cause of the pneumonia is suspected to be bacterial in etiology but organism is not known. The pneumonia is stable. The patient has the following signs/symptoms of pneumonia: cough, sputum production, and chest pain. The patient does not have a current oxygen requirement and the patient does not have a home oxygen requirement. I have reviewed the pertinent imaging. The following cultures have been collected: Blood cultures and Sputum culture.    PLAN:    - Pulmonology consulted given cavitary lung lesion in setting of multiple uncommon exposures.   - Etiology of cavitary lesion likely post-viral 2/2 recent bout of URI symptoms, potentially covid.   - IID consulted given cavitary lung lesion in setting of multiple uncommon exposures.  - Blood cultures, TB testing, sputum cultures all pending.   - Begin course IV abx (vanc/zosyn) in-patient with expected discharge tomorrow on 4-6 weeks of outpatient doxycycline+augmentin. Potential out-patient abx recs change pending culture results.

## 2024-09-09 NOTE — ED PROVIDER NOTES
Encounter Date: 9/9/2024       History     Chief Complaint   Patient presents with    Fever     Sent from clinic, fever < 4 weeks, pneumonia, passed out today    Loss of Consciousness     HPI    44-year-old female with a past medical history of anemia who presents with fever, pneumonia, syncopal episode.  She was seen in clinic earlier today complaining of chest pain and shortness of breath.  She reported 3 weeks of low-grade fevers at night and night sweats.  Three weeks ago she did have an upper respiratory tract infection with yellow then green sputum production.  She reports that her symptoms lasted about 10 days and then they went on vacation to [facility] where they hiked.  She was more fatigued than usual.  She actually got to the top of 1 hike and felt like her whole body was vibrating and then she vomited and had to lay down for 30 minutes, which is not typical for her.  She also had some body aches.  Upon returning home about a week ago, she reports nightly temperatures of about  as well as night sweats.  She continued to feel progressively worse.  Yesterday her neighbor, who is a physician, evaluated her and started her on doxycycline, of which she was taken 3 doses.    This morning she was in the shower and felt unusually hot afterward, got out, then has a period where she does not remember however she woke up on a pillow in the bedroom.  She does not believe she fell because she does not have any trauma or pain and had enough where with all to put a pillow behind her head.  Was not witnessed so no known seizure-like activity but no urinary incontinence or tongue biting.  Not on blood thinners.  Denies neck or back pain.  Today she also had some pain with inspiration on the left posterior lung.  For these reasons she went into clinic to see your PCP.  In clinic she had a chest x-ray done which showed a 4.3 X 5.1 X 5.1 cm cavitary opacity in the left lower lobe that could be neoplasm  versus infectious process.  She was sent in for further evaluation, admission for IV antibiotics.  She also had labs done today which showed a white blood count of 18, CRP of 118    Again, she was otherwise very healthy, dances and exercises often.  No medical problems.  Not on any medications.  Not on any hormones or birth control.  No leg swelling.  They report that the longest they were sitting during the travel was less than 3 hours.  No history of blood clots.  Not a smoker.        Review of patient's allergies indicates:  No Known Allergies  Past Medical History:   Diagnosis Date    Anemia      Past Surgical History:   Procedure Laterality Date    BREAST BIOPSY Left 04/19/2021    TONSILLECTOMY  1983     Family History   Problem Relation Name Age of Onset    Hypertension Father Stephan     Hyperlipidemia Father Stephan     Diabetes Father Stephan     Stroke Father Stephan     Coronary artery disease Father Stephan     Heart attack Father Stephan     Prostate cancer Father Stephan     Cancer Father Stephan     Hyperlipidemia Mother Laura     Coronary artery disease Paternal Grandmother      Cancer Maternal Grandmother Georgia         - lung, smoker    Coronary artery disease Maternal Grandfather      Breast cancer Neg Hx      Colon cancer Neg Hx      Ovarian cancer Neg Hx       Social History     Tobacco Use    Smoking status: Never    Smokeless tobacco: Never   Substance Use Topics    Alcohol use: Yes     Alcohol/week: 1.0 standard drink of alcohol     Types: 1 Drinks containing 0.5 oz of alcohol per week     Comment: social     Drug use: No     Review of Systems    Physical Exam     Initial Vitals [09/09/24 1155]   BP Pulse Resp Temp SpO2   (!) 111/55 82 18 98.2 °F (36.8 °C) 100 %      MAP       --         Physical Exam    Nursing note and vitals reviewed.  Constitutional: She appears well-developed and well-nourished. No distress.   HENT:   Head: Normocephalic and atraumatic.   Nose: Nose normal.   Eyes:  Conjunctivae and EOM are normal. Pupils are equal, round, and reactive to light.   Neck:   Normal range of motion.  Cardiovascular:  Normal rate, regular rhythm and normal heart sounds.     Exam reveals no gallop and no friction rub.       No murmur heard.  Pulmonary/Chest: No respiratory distress. She has no wheezes. She has no rales.   Abnormal lung sounds in the left base   Abdominal: Abdomen is soft. She exhibits no distension. There is no abdominal tenderness. There is no rebound and no guarding.   Musculoskeletal:         General: No tenderness or edema. Normal range of motion.      Cervical back: Normal range of motion.     Neurological: She is alert and oriented to person, place, and time. She has normal strength.   Skin: Skin is warm and dry. Capillary refill takes less than 2 seconds. No pallor.   Psychiatric: She has a normal mood and affect.         ED Course   Procedures  Labs Reviewed   CBC W/ AUTO DIFFERENTIAL - Abnormal       Result Value    WBC 18.02 (*)     RBC 3.87 (*)     Hemoglobin 10.9 (*)     Hematocrit 35.3 (*)     MCV 91      MCH 28.2      MCHC 30.9 (*)     RDW 12.1      Platelets 555 (*)     MPV 9.6      Immature Granulocytes 0.5      Gran # (ANC) 16.0 (*)     Immature Grans (Abs) 0.09 (*)     Lymph # 0.9 (*)     Mono # 1.0      Eos # 0.0      Baso # 0.06      nRBC 0      Gran % 88.7 (*)     Lymph % 4.9 (*)     Mono % 5.5      Eosinophil % 0.1      Basophil % 0.3      Differential Method Automated      Narrative:     Release to patient->Immediate   COMPREHENSIVE METABOLIC PANEL - Abnormal    Sodium 137      Potassium 3.8      Chloride 102      CO2 26      Glucose 116 (*)     BUN 10      Creatinine 0.8      Calcium 9.6      Total Protein 7.9      Albumin 3.3 (*)     Total Bilirubin 0.2      Alkaline Phosphatase 99      AST 18      ALT 13      eGFR >60.0      Anion Gap 9      Narrative:     Release to patient->Immediate   URINALYSIS, REFLEX TO URINE CULTURE - Abnormal    Specimen UA Urine,  Clean Catch      Color, UA Colorless (*)     Appearance, UA Clear      pH, UA 7.0      Specific Gravity, UA 1.010      Protein, UA Negative      Glucose, UA Negative      Ketones, UA Negative      Bilirubin (UA) Negative      Occult Blood UA Negative      Nitrite, UA Negative      Leukocytes, UA Negative      Narrative:     Specimen Source->Urine   CULTURE, BLOOD   CULTURE, BLOOD   AFB CULTURE & SMEAR   MAGNESIUM    Magnesium 2.1      Narrative:     Release to patient->Immediate   TROPONIN I    Troponin I <0.006      Narrative:     Release to patient->Immediate   MAGNESIUM   LACTATE DEHYDROGENASE   HIV 1 / 2 ANTIBODY   HEPATITIS C ANTIBODY   SARS-COV2 (COVID) WITH FLU/RSV BY PCR   LACTATE DEHYDROGENASE   POCT URINE PREGNANCY    POC Preg Test, Ur Negative       Acceptable Yes     ISTAT LACTATE    POC Lactate 0.67      Sample VENOUS      Site Other      Allens Test N/A          ECG Results              EKG 12-lead (Final result)        Collection Time Result Time QRS Duration OHS QTC Calculation    09/09/24 11:58:29 09/09/24 12:41:54 80 457                     Final result by Interface, Lab In [facility] (09/09/24 12:42:05)                   Narrative:    Test Reason : R55,    Vent. Rate : 072 BPM     Atrial Rate : 072 BPM     P-R Int : 116 ms          QRS Dur : 080 ms      QT Int : 418 ms       P-R-T Axes : 065 050 026 degrees     QTc Int : 457 ms    Normal sinus rhythm with sinus arrhythmia  Normal ECG  No previous ECGs available  Confirmed by Debbie Sam MD ([id number]) on 9/9/2024 12:41:51 PM    Referred By:             Confirmed By:Debbie Sam MD                                  Imaging Results              CT Chest With Contrast (In process)  Result time 09/09/24 14:27:32                     Medications   iohexoL (OMNIPAQUE 350) injection 75 mL (75 mLs Intravenous Given 9/9/24 2129)     Medical Decision Making  This is a very healthy 44-year-old female who presents with 3 weeks of generalized malaise  and a cough however 1 week of worsening symptoms including night sweats and now pain with inspiration.  In clinic she was found to have a cavitary lesion on the left side.  Differential includes pneumonia/abscess/infectious process, TB, mass.  We will get full sepsis workup here though currently she was not meeting any SIRS criteria other elevated white blood count.  We will also get a CT chest with contrast.  She will very likely need to be admitted as she has been worsening on oral antibiotics.  We will likely need ID consult as well.  However pending further workup.    TB testing sent.  I did speak with our pharmacist who recommended holding off on antibiotics at this time as the patient was stable.  She should get an inpatient ID consult.  Discussed admission with Hospital Medicine    Amount and/or Complexity of Data Reviewed  Radiology: ordered and independent interpretation performed.     Details: On my independent interpretation, cavitary lesion in the left lobe with air-fluid level.  No pneumothorax  ECG/medicine tests: ordered and independent interpretation performed.     Details: Sinus, regular, rate 70, normal intervals, normal ST segment    Risk  Prescription drug management.                                      Clinical Impression:  Final diagnoses:  [R55] Syncope  [J18.9, J98.4] Cavitary pneumonia (Primary)                 Elda Rodriguez MD  09/09/24 0402

## 2024-09-09 NOTE — PROGRESS NOTES
"Subjective:       Patient ID: Becka Carlson is a 44 y.o. female.    Chief Complaint: Back Pain (4-5 days fever & pain mid back with deep breathing & found herself on the floor but doesn't remember how.)    HPI    Becka Carlson is a 44 y.o. female , English speaking, with a history of:  Anemia    [Local Patient]  Originally from [facility]  Lives in: [place] [address]       PCP: Jake Kendrick MD      Patient comes to the clinic as a walk in complaining of chest pain with deep inspiration.    Patient reports h/o of low grade fevers for the past 3 weeks, mainly at night, chills and "sweating".  3 weeks ago she had an upper respiratory infection but she is not sure if she had COVID (some family members did).    Patient has had some headaches and "yellow sputum", productive cough for the past 3 weeks. Cough had gotten better.    She started taking antibiotic yesterday (doxycycline) that she had a home. She has taken 3 doses sin las night.    This morning patient felt unusually hot after she took a shower and when she got out of the shower she had a syncopal episode in her bedroom. She woke up 3 to 4 minutes later, without recollection of the event, on the floor.    She also felt NEW pain on deep inspiration since this morning and she had 2 episodes of "pink" sputum.    Patient had an abnormal mammogram last year with a corresponding biopsy of her breast tissue and a left axillary lymph node. Pathology result was negative for malignancy.    Patient denies other symptoms such as CP, palpitations, unintentional weight loss.    She has travelled [place] the [facility] during the past 6 months to [place], where she has been hiking. She visited one cave at some point.    Most recent travel 2 weeks ago to [place].    No other complaints.      Past Medical History:   Diagnosis Date    Anemia        Past Surgical History:   Procedure Laterality Date    BREAST BIOPSY Left 04/19/2021    TONSILLECTOMY  1983 "       Family History   Problem Relation Name Age of Onset    Hypertension Father Stephan     Hyperlipidemia Father Stephan     Diabetes Father Stephan     Stroke Father Stephan     Coronary artery disease Father Stephan     Heart attack Father Stephan     Prostate cancer Father Stephan     Cancer Father Stephan     Hyperlipidemia Mother Laura     Coronary artery disease Paternal Grandmother      Cancer Maternal Grandmother Georgia         - lung, smoker    Coronary artery disease Maternal Grandfather      Breast cancer Neg Hx      Colon cancer Neg Hx      Ovarian cancer Neg Hx         Allergies:   Review of patient's allergies indicates:  No Known Allergies      Current Outpatient Medications:     multivitamin capsule, Take 1 capsule by mouth once daily., Disp: , Rfl:     Social history:  , no children  Works as a     Exercise:   Physically active  She is a dancer as well    Diet:   Healthy diet    Tobacco use: Denies    Tobacco Use: Low Risk  (9/9/2024)    Patient History     Smoking Tobacco Use: Never     Smokeless Tobacco Use: Never     Passive Exposure: Not on file        Alcohol use: Denies    Recreational drug use: Denies    Recent travel: [place], [place]      Most recent laboratories reviewed:    Recent Labs   Lab 02/07/23  0654 04/12/24  0745 09/09/24  1031   WBC 5.0 4.4 18.26 H   Hemoglobin 12.1 12.0 10.7 L   Hematocrit 35.1 36.5 32.9 L   MCV 89.8 90.8 90   Platelets 241 260 483 H       Recent Labs   Lab 02/07/23  0654 04/12/24  0745 09/09/24  1031   Glucose 93 85 169 H   Sodium 139 137 136   Potassium 4.2 4.3 4.0   BUN 10 10 10   Creatinine 0.73 0.79 0.8   Total Bilirubin 0.4 0.6 0.3   AST 13 17 14   ALT 11 14 13               Recent Labs   Lab 02/07/23  0654 04/12/24  0745   Cholesterol 140 159   Triglycerides 54 47   HDL 70 71   LDL Cholesterol 57 75   Non HDL Chol. (LDL+VLDL) 70 88       Recent Labs   Lab 02/07/23  0654 04/12/24  0745   TSH 1.59 1.48       Recent Labs   Lab  "02/07/23  0654   Hepatitis C Ab NON-REACTIVE       X-Ray Chest PA And Lateral  Narrative: EXAMINATION:  XR CHEST PA AND LATERAL    CLINICAL HISTORY:  Encounter for general adult medical examination without abnormal findings    TECHNIQUE:  PA and lateral views of the chest were performed.    COMPARISON:  None    FINDINGS:  Normal heart size.  Normal pulmonary vasculature.  No focal right lung infiltrate, right pleural fluid, or right pneumothorax.  4.3 x 5.1 x 5.1 cm cavitary opacity seen in association with the left lower lobe.  At present this could be on the basis of a cavitary neoplasm or infectious process.  Clinical correlation.  Would suggest further evaluation with the contrasted chest CT.  No left pleural fluid or left pneumothorax.  No acute bony findings.  Impression: 4.3 x 5.1 x 5.1 cm cavitary opacity-lesion involving left lower lobe that could relate to neoplasm or infectious process.  Further evaluation with contrasted chest CT recommended.    Dr. Nguyen made aware of finding by secure chat at 1115am by Dr Newton with response from her noting abnormality.    This report was flagged in Epic as abnormal.    Electronically signed by: Maicol Newton  Date:    09/09/2024  Time:    11:18          Latest ECG results:  No results found for this or any previous visit.    Healthcare Maintenance:  Colon cancer screening:       Vaccinations:        Tetanus - 2023       Shingles - no       Influenza - 2023       Pneumonia - n/a       COVID - completed x 2    Depression screening: PHQ2 score = 0    Annual visit approx. Month: APRIL ROS  11-point review of systems done. Negative except for detailed in the HPI.          Objective:      BP (!) 110/57   Pulse 75   Temp 97.9 °F (36.6 °C)   Ht 5' 3" (1.6 m)   Wt 53.1 kg (117 lb 1 oz)   BMI 20.74 kg/m²      Physical Exam   General appearance: Good general aspect, NAD, conversant   Eyes and HEENT: Normal sclerae, moist mucous membranes, no thyromegaly or " lymphadenopathy  Respiratory: No work of breathing, decreased breathing sounds in left lower base, some crackles heard. No rhonchi, wheezing, or rubs.  Left axillary node 2 cm diameter noted.  Cardiovascular: PMI not displaced. RRR. Normal S1, S2. No murmurs, rubs or gallops.  Abdomen and : Soft, non-tender, nondistended, BS, no hepatosplenomegaly, no masses.  Extremities: no edemas, no extremity lymphadenopathy, no clubbing, no cyanosis.  Nervous System: Alert and oriented x 3, good concentration, no deficits.  Skin: Normal temperature, turgor and texture; no rash, ulcers or subcutaneous nodules  Psych: Appropriate affect, alert and oriented to person, place and time          Assessment:       1. Pneumonia with cavity of lung  Assessment & Plan:  New finding on CXR today  3 weeks of fever  Pleuritic pain developed overnight  S/p 3 doses of doxy since yesterday.  No sick contacts  Differential diagnoses pneumonia with cavity Vs malignancy?  I am referring patient to the ER for additional work up, CT chest, IV ATB.    Orders:  -     Lactate Dehydrogenase; Future; Expected date: 09/09/2024  -     Refer to Emergency Dept.    2. Pleuritic chest pain  Assessment & Plan:  Left sided, new  Cavity on Xray (New)  I am sending patient to the ER for evaluation.    Orders:  -     Refer to Emergency Dept.    3. Bandemia  Assessment & Plan:  Lab Results   Component Value Date    WBC 18.26 (H) 09/09/2024    HGB 10.7 (L) 09/09/2024    HCT 32.9 (L) 09/09/2024    MCV 90 09/09/2024     (H) 09/09/2024       I am referring patient to the ER for evaluation, additional labs, lactate, cultures.      4. Normocytic anemia  Assessment & Plan:  Lab Results   Component Value Date    WBC 18.26 (H) 09/09/2024    HGB 10.7 (L) 09/09/2024    HCT 32.9 (L) 09/09/2024    MCV 90 09/09/2024     (H) 09/09/2024       Likely acute 2/2 acute infectious process vs malignancy?  Referral to the ER      5. Thrombocytosis  Assessment &  Plan:  Likely reactive.      6. H/O syncope  Assessment & Plan:  New episode, 3 to 5 min  Possibly vasomotor response, hypotension?  I am referring patient to the ER for evaluation.         Plan:       Proceed to the ER          There are no Patient Instructions on file for this visit.       Problem list updated  Medications reconciled  Education provided  Lifestyle recommendations given  AVS generated, explained, and sent to the patient.  RTC AS NEEDED.          HAYLEY ABDUL MD, MPH  Internal Medicine  [facility]    Cc PCP      Update 9/10/24  Patient discharged home on dual ATB  I have recommended for patient to schedule f/u appointment with myself or PCP  Repeat CBC, CXR recommended  F/u with pulmonology  F/u with ID.   No

## 2024-09-09 NOTE — ASSESSMENT & PLAN NOTE
New finding on CXR today  3 weeks of fever  Pleuritic pain developed overnight  S/p 3 doses of doxy since yesterday.  No sick contacts  Differential diagnoses pneumonia with cavity Vs malignancy?  I am referring patient to the ER for additional work up, CT chest, IV ATB.

## 2024-09-09 NOTE — FIRST PROVIDER EVALUATION
"Medical screening examination initiated.  I have conducted a focused provider triage encounter, findings are as follows:    Brief history of present illness:  43 yo F w/ fever, pleuritic CP, and cavitary lesion to LLL on XR. Elevated wbc today. Referred for CT, cultures. Syncope today    Vitals:    09/09/24 1155   BP: (!) 111/55   Pulse: 82   Resp: 18   Temp: 98.2 °F (36.8 °C)   TempSrc: Oral   SpO2: 100%   Weight: 55.3 kg (122 lb)   Height: 5' 3" (1.6 m)       Pertinent physical exam:  nontoxic, well appearing.    Brief workup plan:  sepsis order set, ct chest    Preliminary workup initiated; this workup will be continued and followed by the physician or advanced practice provider that is assigned to the patient when roomed.  "

## 2024-09-09 NOTE — PROVIDER PROGRESS NOTES - EMERGENCY DEPT.
Encounter Date: 9/9/2024    ED Physician Progress Notes        Physician Note:   Spoke with patient's PCP who came to ED, CXR from today noted, will order CT chest per Rads recs.  Will defer workup to whichever ED doc sees her when roomed.

## 2024-09-09 NOTE — ASSESSMENT & PLAN NOTE
Lab Results   Component Value Date    WBC 18.26 (H) 09/09/2024    HGB 10.7 (L) 09/09/2024    HCT 32.9 (L) 09/09/2024    MCV 90 09/09/2024     (H) 09/09/2024       Likely acute 2/2 acute infectious process vs malignancy?  Referral to the ER

## 2024-09-09 NOTE — SUBJECTIVE & OBJECTIVE
Past Medical History:   Diagnosis Date    Anemia        Past Surgical History:   Procedure Laterality Date    BREAST BIOPSY Left 04/19/2021    TONSILLECTOMY  1983       Review of patient's allergies indicates:  No Known Allergies    No current facility-administered medications on file prior to encounter.     Current Outpatient Medications on File Prior to Encounter   Medication Sig    multivitamin capsule Take 1 capsule by mouth once daily.    [DISCONTINUED] ivermectin (SOOLANTRA) 1 % Crea Apply topically.     Family History       Problem Relation (Age of Onset)    Cancer Father, Maternal Grandmother    Coronary artery disease Father, Paternal Grandmother, Maternal Grandfather    Diabetes Father    Heart attack Father    Hyperlipidemia Father, Mother    Hypertension Father    Prostate cancer Father    Stroke Father          Tobacco Use    Smoking status: Never    Smokeless tobacco: Never   Substance and Sexual Activity    Alcohol use: Yes     Alcohol/week: 1.0 standard drink of alcohol     Types: 1 Drinks containing 0.5 oz of alcohol per week     Comment: social     Drug use: No    Sexual activity: Yes     Partners: Male     Birth control/protection: None     Review of Systems   Constitutional:  Positive for diaphoresis (Night sweats), fatigue and fever.   HENT: Negative.     Eyes: Negative.    Respiratory:  Positive for cough. Negative for shortness of breath.    Cardiovascular:  Positive for chest pain (Pleuritic a/w cough).   Gastrointestinal: Negative.    Endocrine: Negative.    Genitourinary: Negative.    Musculoskeletal:  Positive for myalgias (One event myalgias.).   Skin:  Positive for rash (Minor erythematous, poorly defined splotch on right knee).   Allergic/Immunologic: Negative.    Neurological:  Positive for syncope (Apparent syncopal episode w/ classic prodrome).   Hematological: Negative.    Psychiatric/Behavioral: Negative.       Objective:     Vital Signs (Most Recent):  Temp: 98.2 °F (36.8 °C)  (09/09/24 1402)  Pulse: 76 (09/09/24 1418)  Resp: 18 (09/09/24 1307)  BP: (!) 116/58 (09/09/24 1418)  SpO2: 99 % (09/09/24 1418) Vital Signs (24h Range):  Temp:  [97.9 °F (36.6 °C)-98.3 °F (36.8 °C)] 98.2 °F (36.8 °C)  Pulse:  [75-82] 76  Resp:  [18] 18  SpO2:  [99 %-100 %] 99 %  BP: (102-116)/(55-60) 116/58     Weight: 55.3 kg (121 lb 14.6 oz)  Body mass index is 21.6 kg/m².     Physical Exam           Significant Labs: All pertinent labs within the past 24 hours have been reviewed.  Recent Lab Results  (Last 5 results in the past 24 hours)        09/09/24  1423   09/09/24  1412   09/09/24  1411   09/09/24  1337   09/09/24  1300        Allens Test         N/A       Appearance, UA     Clear           Bilirubin (UA)     Negative           Site         Other       Color, UA     Colorless           Glucose, UA     Negative           Hepatitis C Ab Non-reactive               HIV 1/2 Ag/Ab Non-reactive               Ketones, UA     Negative           Lactate Dehydrogenase 277  Comment: Results are increased in hemolyzed samples.  *Result may be interfered by visible hemolysis                 Leukocyte Esterase, UA     Negative           NITRITE UA     Negative           Blood, UA     Negative           QRS Duration       88         [facility] QTC Calculation       465         pH, UA     7.0           POC Lactate         0.67       hCG Qualitative, Urine     Negative           Protein, UA     Negative  Comment: Recommend a 24 hour urine protein or a urine   protein/creatinine ratio if globulin induced proteinuria is  clinically suspected.              Acceptable     Yes           Sample         VENOUS       SARS-CoV-2 RNA, Amplification, Qual   Negative  Comment: This test utilizes isothermal nucleic acid amplification technology   to   detect the SARS-CoV-2 RdRp nucleic acid segment. The analytical   sensitivity   (limit of detection) is 500 copies/swab.    A POSITIVE result is indicative of the presence of  SARS-CoV-2 RNA;   clinical   correlation with patient history and other diagnostic information is   necessary to determine patient infection status.    A NEGATIVE result means that SARS-CoV-2 nucleic acids are not present   above   the limit of detection. A NEGATIVE result should be treated as   presumptive.   It does not rule out the possibility of COVID-19 and should not be   the sole   basis for treatment decisions.    If COVID-19 is strongly suspected based on clinical and exposure   history,   re-testing using an alternate molecular assay should be considered.    This test is Food and Drug Administration (FDA) approved. Performance   characteristics of this has been independently verified by [facility] Department of Pathology and Laboratory Medicine.               Spec Grav UA     1.010           Specimen UA     Urine, Clean Catch                                  Significant Imaging: I have reviewed all pertinent imaging results/findings within the past 24 hours.

## 2024-09-10 VITALS
BODY MASS INDEX: 21.62 KG/M2 | RESPIRATION RATE: 16 BRPM | DIASTOLIC BLOOD PRESSURE: 59 MMHG | TEMPERATURE: 98 F | SYSTOLIC BLOOD PRESSURE: 109 MMHG | HEIGHT: 63 IN | HEART RATE: 77 BPM | OXYGEN SATURATION: 98 % | WEIGHT: 122 LBS

## 2024-09-10 DIAGNOSIS — J18.9 PNEUMONIA WITH CAVITY OF LUNG: Primary | ICD-10-CM

## 2024-09-10 DIAGNOSIS — J98.4 PNEUMONIA WITH CAVITY OF LUNG: Primary | ICD-10-CM

## 2024-09-10 LAB
ALBUMIN SERPL BCP-MCNC: 2.9 G/DL (ref 3.5–5.2)
ALP SERPL-CCNC: 89 U/L (ref 55–135)
ALT SERPL W/O P-5'-P-CCNC: 13 U/L (ref 10–44)
ANION GAP SERPL CALC-SCNC: 11 MMOL/L (ref 8–16)
AST SERPL-CCNC: 13 U/L (ref 10–40)
BASOPHILS # BLD AUTO: 0.03 K/UL (ref 0–0.2)
BASOPHILS NFR BLD: 0.3 % (ref 0–1.9)
BILIRUB SERPL-MCNC: 0.2 MG/DL (ref 0.1–1)
BUN SERPL-MCNC: 9 MG/DL (ref 6–20)
CALCIUM SERPL-MCNC: 9.4 MG/DL (ref 8.7–10.5)
CHLORIDE SERPL-SCNC: 103 MMOL/L (ref 95–110)
CO2 SERPL-SCNC: 24 MMOL/L (ref 23–29)
CREAT SERPL-MCNC: 0.8 MG/DL (ref 0.5–1.4)
DIFFERENTIAL METHOD BLD: ABNORMAL
EOSINOPHIL # BLD AUTO: 0 K/UL (ref 0–0.5)
EOSINOPHIL NFR BLD: 0.4 % (ref 0–8)
ERYTHROCYTE [DISTWIDTH] IN BLOOD BY AUTOMATED COUNT: 12.1 % (ref 11.5–14.5)
EST. GFR  (NO RACE VARIABLE): >60 ML/MIN/1.73 M^2
GLUCOSE SERPL-MCNC: 100 MG/DL (ref 70–110)
HCT VFR BLD AUTO: 33.1 % (ref 37–48.5)
HGB BLD-MCNC: 10.5 G/DL (ref 12–16)
IMM GRANULOCYTES # BLD AUTO: 0.05 K/UL (ref 0–0.04)
IMM GRANULOCYTES NFR BLD AUTO: 0.4 % (ref 0–0.5)
LYMPHOCYTES # BLD AUTO: 1.1 K/UL (ref 1–4.8)
LYMPHOCYTES NFR BLD: 9.8 % (ref 18–48)
MAGNESIUM SERPL-MCNC: 2.2 MG/DL (ref 1.6–2.6)
MCH RBC QN AUTO: 28.9 PG (ref 27–31)
MCHC RBC AUTO-ENTMCNC: 31.7 G/DL (ref 32–36)
MCV RBC AUTO: 91 FL (ref 82–98)
MONOCYTES # BLD AUTO: 0.9 K/UL (ref 0.3–1)
MONOCYTES NFR BLD: 8 % (ref 4–15)
MRSA SCREEN BY PCR: NOT DETECTED
NEUTROPHILS # BLD AUTO: 9.2 K/UL (ref 1.8–7.7)
NEUTROPHILS NFR BLD: 81.1 % (ref 38–73)
NRBC BLD-RTO: 0 /100 WBC
PHOSPHATE SERPL-MCNC: 3.4 MG/DL (ref 2.7–4.5)
PLATELET # BLD AUTO: 523 K/UL (ref 150–450)
PMV BLD AUTO: 9.9 FL (ref 9.2–12.9)
POTASSIUM SERPL-SCNC: 4.1 MMOL/L (ref 3.5–5.1)
PROCALCITONIN SERPL IA-MCNC: 0.1 NG/ML
PROT SERPL-MCNC: 7.1 G/DL (ref 6–8.4)
RBC # BLD AUTO: 3.63 M/UL (ref 4–5.4)
SODIUM SERPL-SCNC: 138 MMOL/L (ref 136–145)
WBC # BLD AUTO: 11.32 K/UL (ref 3.9–12.7)

## 2024-09-10 PROCEDURE — 87081 CULTURE SCREEN ONLY: CPT

## 2024-09-10 PROCEDURE — 99204 OFFICE O/P NEW MOD 45 MIN: CPT | Mod: ,,, | Performed by: INTERNAL MEDICINE

## 2024-09-10 PROCEDURE — 96366 THER/PROPH/DIAG IV INF ADDON: CPT

## 2024-09-10 PROCEDURE — 87116 MYCOBACTERIA CULTURE: CPT | Performed by: EMERGENCY MEDICINE

## 2024-09-10 PROCEDURE — 87641 MR-STAPH DNA AMP PROBE: CPT

## 2024-09-10 PROCEDURE — 84145 PROCALCITONIN (PCT): CPT | Performed by: HOSPITALIST

## 2024-09-10 PROCEDURE — 25000003 PHARM REV CODE 250

## 2024-09-10 PROCEDURE — 25000003 PHARM REV CODE 250: Performed by: HOSPITALIST

## 2024-09-10 PROCEDURE — 80053 COMPREHEN METABOLIC PANEL: CPT

## 2024-09-10 PROCEDURE — 63600175 PHARM REV CODE 636 W HCPCS: Performed by: HOSPITALIST

## 2024-09-10 PROCEDURE — 94761 N-INVAS EAR/PLS OXIMETRY MLT: CPT

## 2024-09-10 PROCEDURE — 85025 COMPLETE CBC W/AUTO DIFF WBC: CPT

## 2024-09-10 PROCEDURE — 96376 TX/PRO/DX INJ SAME DRUG ADON: CPT

## 2024-09-10 PROCEDURE — 36415 COLL VENOUS BLD VENIPUNCTURE: CPT

## 2024-09-10 PROCEDURE — 87305 ASPERGILLUS AG IA: CPT | Performed by: INTERNAL MEDICINE

## 2024-09-10 PROCEDURE — 84100 ASSAY OF PHOSPHORUS: CPT

## 2024-09-10 PROCEDURE — 86480 TB TEST CELL IMMUN MEASURE: CPT

## 2024-09-10 PROCEDURE — G0378 HOSPITAL OBSERVATION PER HR: HCPCS

## 2024-09-10 PROCEDURE — 87015 SPECIMEN INFECT AGNT CONCNTJ: CPT | Performed by: EMERGENCY MEDICINE

## 2024-09-10 PROCEDURE — 87449 NOS EACH ORGANISM AG IA: CPT

## 2024-09-10 PROCEDURE — 87206 SMEAR FLUORESCENT/ACID STAI: CPT | Mod: 59 | Performed by: EMERGENCY MEDICINE

## 2024-09-10 PROCEDURE — 86403 PARTICLE AGGLUT ANTBDY SCRN: CPT | Performed by: INTERNAL MEDICINE

## 2024-09-10 PROCEDURE — 63600175 PHARM REV CODE 636 W HCPCS

## 2024-09-10 PROCEDURE — 36415 COLL VENOUS BLD VENIPUNCTURE: CPT | Performed by: INTERNAL MEDICINE

## 2024-09-10 PROCEDURE — 83735 ASSAY OF MAGNESIUM: CPT

## 2024-09-10 RX ORDER — DOXYCYCLINE 100 MG/1
100 CAPSULE ORAL 2 TIMES DAILY
Qty: 42 CAPSULE | Refills: 1 | Status: SHIPPED | OUTPATIENT
Start: 2024-09-10 | End: 2024-09-10

## 2024-09-10 RX ORDER — SULFAMETHOXAZOLE AND TRIMETHOPRIM 800; 160 MG/1; MG/1
1 TABLET ORAL 2 TIMES DAILY
Qty: 42 TABLET | Refills: 0 | Status: SHIPPED | OUTPATIENT
Start: 2024-09-10 | End: 2024-10-01

## 2024-09-10 RX ORDER — AMOXICILLIN AND CLAVULANATE POTASSIUM 875; 125 MG/1; MG/1
1 TABLET, FILM COATED ORAL EVERY 12 HOURS
Qty: 42 TABLET | Refills: 1 | Status: SHIPPED | OUTPATIENT
Start: 2024-09-10 | End: 2024-10-22

## 2024-09-10 RX ADMIN — PIPERACILLIN SODIUM AND TAZOBACTAM SODIUM 4.5 G: 4; .5 INJECTION, POWDER, FOR SOLUTION INTRAVENOUS at 02:09

## 2024-09-10 RX ADMIN — THERA TABS 1 TABLET: TAB at 09:09

## 2024-09-10 RX ADMIN — VANCOMYCIN HYDROCHLORIDE 750 MG: 750 INJECTION, POWDER, LYOPHILIZED, FOR SOLUTION INTRAVENOUS at 09:09

## 2024-09-10 NOTE — PLAN OF CARE
Problem: Pneumonia  Goal: Fluid Balance  Outcome: Progressing  Goal: Effective Oxygenation and Ventilation  Outcome: Progressing

## 2024-09-10 NOTE — SUBJECTIVE & OBJECTIVE
Interval History: NAEO.    Review of Systems   Constitutional: Negative.    HENT: Negative.     Eyes: Negative.    Respiratory: Negative.          Continues to cough up sputum; otherwise negative.    Cardiovascular: Negative.    Gastrointestinal: Negative.    Endocrine: Negative.    Genitourinary: Negative.    Musculoskeletal: Negative.    Skin: Negative.    Allergic/Immunologic: Negative.    Neurological: Negative.    Hematological: Negative.    Psychiatric/Behavioral: Negative.       Objective:     Vital Signs (Most Recent):  Temp: 98.2 °F (36.8 °C) (09/10/24 1252)  Pulse: 77 (09/10/24 1252)  Resp: 16 (09/10/24 1252)  BP: (!) 109/59 (09/10/24 1252)  SpO2: 98 % (09/10/24 1252) Vital Signs (24h Range):  Temp:  [98.1 °F (36.7 °C)-98.2 °F (36.8 °C)] 98.2 °F (36.8 °C)  Pulse:  [67-90] 77  Resp:  [16-20] 16  SpO2:  [96 %-100 %] 98 %  BP: (107-118)/(56-70) 109/59     Weight: 55.3 kg (122 lb)  Body mass index is 21.61 kg/m².    Intake/Output Summary (Last 24 hours) at 9/10/2024 1524  Last data filed at 9/9/2024 2051  Gross per 24 hour   Intake 27.68 ml   Output --   Net 27.68 ml         Physical Exam  Constitutional:       General: She is not in acute distress.     Appearance: Normal appearance. She is not ill-appearing or diaphoretic.   HENT:      Head: Normocephalic and atraumatic.      Mouth/Throat:      Mouth: Mucous membranes are moist.      Pharynx: Oropharynx is clear.   Eyes:      General:         Left eye: No discharge.   Cardiovascular:      Rate and Rhythm: Normal rate and regular rhythm.      Pulses: Normal pulses.      Heart sounds: Normal heart sounds. No murmur heard.     No friction rub.   Pulmonary:      Effort: Pulmonary effort is normal. No respiratory distress.      Breath sounds: Normal breath sounds. No wheezing.   Abdominal:      General: Abdomen is flat. There is no distension.      Palpations: Abdomen is soft.      Tenderness: There is no abdominal tenderness.   Musculoskeletal:         General:  Normal range of motion.      Cervical back: Normal range of motion and neck supple.   Skin:     General: Skin is warm and dry.   Neurological:      General: No focal deficit present.      Mental Status: She is alert and oriented to person, place, and time.             Significant Labs: All pertinent labs within the past 24 hours have been reviewed.  Recent Lab Results         09/10/24  0833   09/10/24  0319   09/09/24  2042        Procalcitonin   0.10  Comment: A concentration < 0.25 ng/mL represents a low risk of bacterial   infection.  Procalcitonin may not be accurate among patients with localized   infection, recent trauma or major surgery, immunosuppressed state,   invasive fungal infection, renal dysfunction. Decisions regarding   initiation or continuation of antibiotic therapy should not be based   solely on procalcitonin levels.           AFB CULTURE STAIN     No acid fast bacilli seen.       Albumin   2.9         ALP   89         ALT   13         Anion Gap   11         AST   13         Baso #   0.03         Basophil %   0.3         BILIRUBIN TOTAL   0.2  Comment: For infants and newborns, interpretation of results should be based  on gestational age, weight and in agreement with clinical  observations.    Premature Infant recommended reference ranges:  Up to 24 hours.............<8.0 mg/dL  Up to 48 hours............<12.0 mg/dL  3-5 days..................<15.0 mg/dL  6-29 days.................<15.0 mg/dL           BUN   9         Calcium   9.4         Chloride   103         CO2   24         Creatinine   0.8         Differential Method   Automated         eGFR   >60.0         Eos #   0.0         Eos %   0.4         Glucose   100         Gram Stain (Respiratory)     <10 epithelial cells per low power field.            Few WBC's            Moderate Gram positive cocci       Gran # (ANC)   9.2         Gran %   81.1         Hematocrit   33.1         Hemoglobin   10.5         Immature Grans (Abs)    0.05  Comment: Mild elevation in immature granulocytes is non specific and   can be seen in a variety of conditions including stress response,   acute inflammation, trauma and pregnancy. Correlation with other   laboratory and clinical findings is essential.           Immature Granulocytes   0.4         Lymph #   1.1         Lymph %   9.8         Magnesium    2.2         MCH   28.9         MCHC   31.7         MCV   91         Mono #   0.9         Mono %   8.0         MPV   9.9         MRSA SCREEN BY PCR Not Detected           nRBC   0         Phosphorus Level   3.4         Platelet Count   523         Potassium   4.1         PROTEIN TOTAL   7.1         RBC   3.63         RDW   12.1         Sodium   138         WBC   11.32                 Significant Imaging: I have reviewed all pertinent imaging results/findings within the past 24 hours.

## 2024-09-10 NOTE — HOSPITAL COURSE
"44 yo with cavitary lung lesion consistent with lung abscess. TB less likely given lack of close contacts. She did have contacts with viral URI symptoms so post viral PNA is high on differential. She was splelunking in [place] so consider histo, blasto less likely.    Following vanc/zosyn administration overnight and collection of lab samples for certain infectious disease r/o / work up, discharged to home w/ 6 weeks oral augmentin/doxy. Pulm recs while inpatient as below. Would f/u on pending lab workup outpatient. ID recs pending, would f/u w/ recs while outpatient.      PULM RECS:    In my opinion, she does not need TB r/o and can be discharged with a long course of Abx, would recommend doxycycline and Augmentin for  4-6 weeks based on repeat imaging and clinical course.   Obtain MRSA nasal PCR to determine if MRSA coverage can be dropped.   I have set her up with pulmonary follow up in the [facility] for 9/20/24. Timing of repeat imaging can be determine at that visit.  I counseled her on risk of developing a pleural effusion and if she develops worsening pain, shortness or breath or overall decline to return to the ED.   Counseled her that this is extremely unlikely to be malignancy as that was one of her concerns and that we will repeat imaging in the future.   Management of her pleuritic pain per primary.     NOTE: FOLLOWING DISCHARGE, ID recs to discontinue doxycycline. See full recs below:    - "Augmentin 875/125 mg Bid and 1 double strength Bactrim Bid, both antibiotics for 3 weeks. Recommend CT chest without contrast in 2 weeks."   "

## 2024-09-10 NOTE — CONSULTS
[facility] ([facility])  Infectious Disease  Consult Note    Patient Name: Becka Carlson  MRN: [id number]  Admission Date: 9/9/2024  Hospital Length of Stay: 0 days  Attending Physician: No att. providers found  Primary Care Provider: Jake Kendrick MD     Isolation Status: Airborne    Patient information was obtained from patient, past medical records, ER records, and primary team.      Inpatient consult to Infectious Diseases  Consult performed by: Kathleen Felton MD  Consult ordered by: Joe Garber MD        Assessment/Plan:     Pulmonary  * Pneumonia with cavity of lung  Ms. Robyn kilpatrick a 44-year-old female with no significant past medical history who presents with 3 weeks of upper respiratory symptoms, including malaise, productive cough, and a possible syncopal event while showering. She reports night sweats, exposure to incarcerated individuals through her job as a  (though not prolonged), and recent travel to [place], where she participated in [employer]. She denies recent weight loss. Chest X-ray and CT performed on admission show a left lower lobe cavitary lesion concerning for pneumonia or pulmonary abscess. Labs on admission include lactate within normal limits, hepatitis C negative, HIV negative, COVID-19 negative, and WBC of 18.02.     Plan:  -- Patient can be discharged on Augmentin 825/125 mg Bid for 3 weeks  -- 1 double strength Bactrim Bid for 3 weeks   -- Repeat CT chest without contrast in 2 weeks        Thank you for your consult. I will follow-up with patient. Please contact us if you have any additional questions.    Kathleen Felton MD  Infectious Disease  [facility] ([facility])    Subjective:     Principal Problem: Pneumonia with cavity of lung    HPI: Ms. Robyn kilpatrick a 44-year-old female with no significant past medical history who presents with 3 weeks of upper respiratory symptoms, including  "malaise, productive cough, and a possible syncopal event while showering. She reports night sweats, exposure to incarcerated individuals through her job as a  (though not prolonged), and recent travel to [place], where she participated in [employer]. She denies recent weight loss. Chest X-ray and CT performed on admission show a left lower lobe cavitary lesion concerning for pneumonia or pulmonary abscess. Labs on admission include lactate within normal limits, hepatitis C negative, HIV negative, COVID-19 negative, and WBC of 18.02.     ID consulted for further management.    Past Medical History:   Diagnosis Date    Anemia        Past Surgical History:   Procedure Laterality Date    BREAST BIOPSY Left 04/19/2021    TONSILLECTOMY  1983       Review of patient's allergies indicates:  No Known Allergies    Medications:  No medications prior to admission.     Antibiotics (From admission, onward)      Start     Stop Route Frequency Ordered    09/10/24 0830  vancomycin 750 mg in D5W 250 mL IVPB (admixture device)         -- IV Every 12 hours (non-standard times) 09/09/24 2118 09/09/24 1930  vancomycin (VANCOCIN) 1,000 mg in D5W 250 mL IVPB (admixture device)         -- IV Once 09/09/24 1746 09/09/24 1839  vancomycin - pharmacy to dose  (vancomycin IVPB (PEDS and ADULTS))        Placed in "And" Linked Group    -- IV pharmacy to manage frequency 09/09/24 1740 09/09/24 1830  piperacillin-tazobactam (ZOSYN) 4.5 g in D5W 100 mL IVPB (MB+)         -- IV Every 8 hours (non-standard times) 09/09/24 1740          Antifungals (From admission, onward)      None          Antivirals (From admission, onward)      None             Immunization History   Administered Date(s) Administered    COVID-19, MRNA, LN-S, PF (Pfizer) (Purple Cap) 11/18/2021    Influenza 10/15/2018, 10/18/2023    Influenza A (H1N1) 2009 Monovalent - IM 01/05/2010    Tdap 01/27/2023       Family History       Problem Relation (Age of Onset) "    Cancer Father, Maternal Grandmother    Coronary artery disease Father, Paternal Grandmother, Maternal Grandfather    Diabetes Father    Heart attack Father    Hyperlipidemia Father, Mother    Hypertension Father    Prostate cancer Father    Stroke Father          Social History     Socioeconomic History    Marital status:    Tobacco Use    Smoking status: Never    Smokeless tobacco: Never   Substance and Sexual Activity    Alcohol use: Yes     Alcohol/week: 1.0 standard drink of alcohol     Types: 1 Drinks containing 0.5 oz of alcohol per week     Comment: social     Drug use: No    Sexual activity: Yes     Partners: Male     Birth control/protection: None     Social Determinants of Health     Financial Resource Strain: Low Risk  (9/10/2024)    Overall Financial Resource Strain (CARDIA)     Difficulty of Paying Living Expenses: Not very hard   Food Insecurity: No Food Insecurity (9/10/2024)    Hunger Vital Sign     Worried About Running Out of Food in the Last Year: Never true     Ran Out of Food in the Last Year: Never true   Transportation Needs: No Transportation Needs (9/10/2024)    TRANSPORTATION NEEDS     Transportation : No   Physical Activity: Insufficiently Active (9/10/2024)    Exercise Vital Sign     Days of Exercise per Week: 3 days     Minutes of Exercise per Session: 30 min   Stress: No Stress Concern Present (9/10/2024)    [employer] - Occupational Stress Questionnaire     Feeling of Stress : Not at all   Housing Stability: Low Risk  (9/10/2024)    Housing Stability Vital Sign     Unable to Pay for Housing in the Last Year: No     Homeless in the Last Year: No     Review of Systems   Constitutional:  Negative for activity change, diaphoresis and fever.   HENT:  Negative for congestion and rhinorrhea.    Eyes:  Negative for discharge and redness.   Respiratory:  Negative for shortness of breath and wheezing.         Continues to cough up sputum; otherwise  negative.    Cardiovascular:  Negative for chest pain, palpitations and leg swelling.   Gastrointestinal: Negative.    Endocrine: Negative.    Genitourinary: Negative.    Musculoskeletal: Negative.    Skin:  Negative for pallor, rash and wound.   Allergic/Immunologic: Negative.    Neurological: Negative.    Hematological: Negative.    Psychiatric/Behavioral: Negative.       Objective:     Vital Signs (Most Recent):  Temp: 98.2 °F (36.8 °C) (09/10/24 1252)  Pulse: 77 (09/10/24 1252)  Resp: 16 (09/10/24 1252)  BP: (!) 109/59 (09/10/24 1252)  SpO2: 98 % (09/10/24 1252) Vital Signs (24h Range):  Temp:  [98.1 °F (36.7 °C)-98.2 °F (36.8 °C)] 98.2 °F (36.8 °C)  Pulse:  [67-90] 77  Resp:  [16-20] 16  SpO2:  [96 %-100 %] 98 %  BP: (107-118)/(56-70) 109/59     Weight: 55.3 kg (122 lb)  Body mass index is 21.61 kg/m².    Estimated Creatinine Clearance: 74.2 mL/min (based on SCr of 0.8 mg/dL).     Physical Exam  Vitals and nursing note reviewed.   Constitutional:       General: She is not in acute distress.     Appearance: Normal appearance. She is not ill-appearing or diaphoretic.   HENT:      Head: Normocephalic and atraumatic.      Right Ear: Tympanic membrane normal.      Left Ear: Tympanic membrane normal.      Mouth/Throat:      Mouth: Mucous membranes are moist.      Pharynx: Oropharynx is clear.   Eyes:      General:         Left eye: No discharge.   Cardiovascular:      Rate and Rhythm: Normal rate and regular rhythm.      Pulses: Normal pulses.      Heart sounds: Normal heart sounds. No murmur heard.     No friction rub.   Pulmonary:      Effort: Pulmonary effort is normal. No respiratory distress.      Breath sounds: Normal breath sounds. No wheezing.   Abdominal:      General: Abdomen is flat. There is no distension.      Palpations: Abdomen is soft.      Tenderness: There is no abdominal tenderness.   Musculoskeletal:         General: Normal range of motion.      Cervical back: Normal range of motion and neck  supple.   Skin:     General: Skin is warm.      Coloration: Skin is not jaundiced.   Neurological:      General: No focal deficit present.      Mental Status: She is alert and oriented to person, place, and time.          Significant Labs: All pertinent labs within the past 24 hours have been reviewed.    Significant Imaging: I have reviewed all pertinent imaging results/findings within the past 24 hours.

## 2024-09-10 NOTE — NURSING
Nurses Note -- 4 Eyes      9/9/2024   11:07 PM      Skin assessed during: Admit             [x] No Altered Skin Integrity Present    []Prevention Measures Documented      [] Yes- Altered Skin Integrity Present or Discovered   [] LDA Added if Not in Epic (Describe Wound)   [] New Altered Skin Integrity was Present on Admit and Documented in LDA   [] Wound Image Taken    Wound Care Consulted? No    Attending Nurse:  Naila Baca RN/Staff Member:  CATA Yoo/Xochitl IVY

## 2024-09-10 NOTE — PHARMACY MED REC
"Admission Medication History     The home medication history was taken by Telma Brown.    You may go to "Admission" then "Reconcile Home Medications" tabs to review and/or act upon these items.     The home medication list has been updated by the Pharmacy department.   Please read ALL comments highlighted in yellow.   Please address this information as you see fit.    Feel free to contact us if you have any questions or require assistance.      Medications listed below were obtained from: Patient/family and Analytic software- [employer]  Current Outpatient Medications on File Prior to Encounter   Medication Sig    acetaminophen (TYLENOL) 650 MG TbSR   Take 650 mg by mouth daily as needed (Pain).    diclofenac sodium (VOLTAREN) 1 % Gel   Apply 2 g topically daily as needed (Pain).    ivermectin (SOOLANTRA) 1 % Crea   Apply topically every other day.    multivitamin capsule   Take 1 capsule by mouth once daily.                 Telma Brown  EXT [id number]               .          "

## 2024-09-10 NOTE — ASSESSMENT & PLAN NOTE
Ms. Robyn heck pleasant a 44-year-old female with no significant past medical history who presents with 3 weeks of upper respiratory symptoms, including malaise, productive cough, and a possible syncopal event while showering. She reports night sweats, exposure to incarcerated individuals through her job as a  (though not prolonged), and recent travel to [place], where she participated in [employer]. She denies recent weight loss. Chest X-ray and CT performed on admission show a left lower lobe cavitary lesion concerning for pneumonia or pulmonary abscess. Labs on admission include lactate within normal limits, hepatitis C negative, HIV negative, COVID-19 negative, and WBC of 18.02.     Plan:  -- Patient can be discharged on Augmentin 825/125 mg Bid for 3 weeks  -- 1 double strength Bactrim Bid for 3 weeks   -- Repeat CT chest without contrast in 2 weeks

## 2024-09-10 NOTE — HPI
Ms. Robyn heck pleasant a 44-year-old female with no significant past medical history who presents with 3 weeks of upper respiratory symptoms, including malaise, productive cough, and a possible syncopal event while showering. She reports night sweats, exposure to incarcerated individuals through her job as a  (though not prolonged), and recent travel to [place], where she participated in [employer]. She denies recent weight loss. Chest X-ray and CT performed on admission show a left lower lobe cavitary lesion concerning for pneumonia or pulmonary abscess. Labs on admission include lactate within normal limits, hepatitis C negative, HIV negative, COVID-19 negative, and WBC of 18.02.     ID consulted for further management.

## 2024-09-10 NOTE — NURSING
Patient received on unit from ED via stretcher. AAOx4. VSS. Productive cough. O2 @ 97% on RA. IV abx in progress via pump. Denies any CP or SOB. Airborne precautions initiated. Spouse at bedside.  NAD noted. Monitoring.

## 2024-09-10 NOTE — ASSESSMENT & PLAN NOTE
Pleuritic chest pain.  Bandemia.  Thrombocytosis (reactive).      Patient has a diagnosis of pneumonia. The cause of the pneumonia is suspected to be bacterial in etiology but organism is not known. The pneumonia is stable. The patient has the following signs/symptoms of pneumonia: cough, sputum production, and chest pain. The patient does not have a current oxygen requirement and the patient does not have a home oxygen requirement. I have reviewed the pertinent imaging. The following cultures have been collected: Blood cultures and Sputum culture.    PLAN:    - Pulmonology consulted given cavitary lung lesion in setting of multiple uncommon exposures. Recs as below.   - Etiology of cavitary lesion likely post-viral 2/2 recent bout of URI symptoms, potentially covid.   - In my opinion, she does not need TB r/o and can be discharged with a long course of Abx, would recommend doxycycline and Augmentin for  4-6 weeks based on repeat imaging and clinical course.   - Obtain MRSA nasal PCR to determine if MRSA coverage can be dropped.   - I have set her up with pulmonary follow up in the [facility] for 9/20/24. Timing of repeat imaging can be determine at that visit.  - I counseled her on risk of developing a pleural effusion and if she develops worsening pain, shortness or breath or overall decline to return to the ED.   -Counseled her that this is extremely unlikely to be malignancy as that was one of her concerns and that we will repeat imaging in the future.   -Management of her pleuritic pain per primary.     - IID consulted given cavitary lung lesion in setting of multiple uncommon exposures.  - Blood cultures, TB testing, sputum cultures all pending.   - Begin course IV abx (vanc/zosyn) in-patient with expected discharge tomorrow on 4-6 weeks of outpatient doxycycline+augmentin. Potential out-patient abx recs change pending culture results.    - IID messages after discharge asking to change medications  to augmentin double strength (ordered) and bactrim instead of doxy. See below:  -Augmentin 875/125 mg Bid and 1 double strength Bactrim Bid, both antibiotics for 3 weeks. Recommend CT chest without contrast in 2 weeks.

## 2024-09-10 NOTE — PLAN OF CARE
[facility] - Stepdown Flex ([place])  Initial Discharge Assessment       Primary Care Provider: Jake Kendrick MD    Admission Diagnosis: Syncope [R55]  Chest pain [R07.9]  Cavitary pneumonia [J18.9, J98.4]    Admission Date: 9/9/2024  Expected Discharge Date: 9/10/2024    Transition of Care Barriers: None    Payor: [employer] / Plan: [facility] TIERED / Product Type: Commercial /     Extended Emergency Contact Information  Primary Emergency Contact: Eric Carlson  Address: [address] [address] [facility]  Home Phone: [phone]  Mobile Phone: [phone]  Relation: Spouse  Secondary Emergency Contact: Laura Fajardo  Address: [address] [address] [facility]  Home Phone: [phone]  Relation: Mother    Discharge Plan A: Home with family  Discharge Plan B: Home      [id number] - [place] - [address] [place] [address] AT [facility] [id number] [facility]  [place] [address]  Phone: [phone] Fax: [phone]    [facility] - [place] - [address]  [address] [place] [address]  [place] [address]  Phone: [phone] Fax: [phone]      Initial Assessment (most recent)       Adult Discharge Assessment - 09/10/24 1402          Discharge Assessment    Assessment Type Discharge Planning Assessment     Confirmed/corrected address, phone number and insurance Yes     Confirmed Demographics Correct on Facesheet     Source of Information patient     Does patient/caregiver understand observation status Yes     Communicated DANIEL with patient/caregiver Yes     Reason For Admission Syncope     People in Home spouse     Do you expect to return to your current living situation? Yes     Prior to hospitilization cognitive status: Alert/Oriented     Current cognitive status: Alert/Oriented     Walking or Climbing Stairs Difficulty no     Dressing/Bathing  Difficulty no     Equipment Currently Used at Home none     Readmission within 30 days? No     Patient currently being followed by outpatient case management? No     Do you take prescription medications? No     Do you have prescription coverage? Yes     Coverage [facility] TIERED     Do you have any problems affording any of your prescribed medications? No     Is the patient taking medications as prescribed? yes     Who is going to help you get home at discharge? SPOUSE     How do you get to doctors appointments? family or friend will provide     Are you on dialysis? No     Do you take coumadin? No     Discharge Plan A Home with family     Discharge Plan B Home     DME Needed Upon Discharge  none     Discharge Plan discussed with: Patient     Transition of Care Barriers None        Physical Activity    On average, how many days per week do you engage in moderate to strenuous exercise (like a brisk walk)? 3 days     On average, how many minutes do you engage in exercise at this level? 30 min        Financial Resource Strain    How hard is it for you to pay for the very basics like food, housing, medical care, and heating? Not very hard        Housing Stability    In the last 12 months, was there a time when you were not able to pay the mortgage or rent on time? No     At any time in the past 12 months, were you homeless or living in a shelter (including now)? No        Transportation Needs    Has the lack of transportation kept you from medical appointments, meetings, work or from getting things needed for daily living? No        Food Insecurity    Within the past 12 months, you worried that your food would run out before you got the money to buy more. Never true     Within the past 12 months, the food you bought just didn't last and you didn't have money to get more. Never true        Stress    Do you feel stress - tense, restless, nervous, or anxious, or unable to sleep at night  because your mind is troubled all the time - these days? Not at all        Social Isolation    How often do you feel lonely or isolated from those around you?  Never        Alcohol Use    Q1: How often do you have a drink containing alcohol? Patient declined     Q2: How many drinks containing alcohol do you have on a typical day when you are drinking? Patient declined     Q3: How often do you have six or more drinks on one occasion? Patient declined                        SW met with PT to discuss discharge planning.  Pt lives with spouse and doesn't need assistance with ambulation and ADLs.  No dialysis.  Pt will have transportation and assistance from spouse at discharge.  Discharge Plan A and Plan B have been determined by review of patient's clinical status, future medical and therapeutic needs, and coverage/benefits for post-acute care in coordination with multidisciplinary team members.        Pita Black MSW, CSW

## 2024-09-10 NOTE — PROGRESS NOTES
"Pharmacokinetic Initial Assessment: IV Vancomycin    Assessment/Plan:    Initiate intravenous vancomycin with loading dose of 1000 mg once, done in ED, followed by a maintenance dose of vancomycin 750 mg IV every 12 hours.  Desired empiric serum trough concentration is 15 to 20 mcg/mL.  Draw vancomycin random level on 09/10/2024 at 0730.  Pharmacy will continue to follow and monitor vancomycin.      Please contact pharmacy at extension [phone] with any questions regarding this assessment.     Thank you for the consult,   Daya Murillo       Patient brief summary:  Becka Carlson is a 44 y.o. female initiated on antimicrobial therapy with IV Vancomycin for treatment of suspected lower respiratory infection.    Drug Allergies:   Review of patient's allergies indicates:  No Known Allergies    Actual Body Weight:   55.3 kg    Renal Function:   Estimated Creatinine Clearance: 74.2 mL/min (based on SCr of 0.8 mg/dL).     CBC (last 72 hours):  Recent Labs   Lab Result Units 09/09/24  1031 09/09/24  1258   WBC K/uL 18.26* 18.02*   Hemoglobin g/dL 10.7* 10.9*   Hematocrit % 32.9* 35.3*   Platelets K/uL 483* 555*   Gran % % 90.4* 88.7*   Lymph % % 3.6* 4.9*   Mono % % 4.9 5.5   Eosinophil % % 0.1 0.1   Basophil % % 0.4 0.3   Differential Method  Automated Automated       Metabolic Panel (last 72 hours):  Recent Labs   Lab Result Units 09/09/24  1031 09/09/24  1258 09/09/24  1411   Sodium mmol/L 136 137  --    Potassium mmol/L 4.0 3.8  --    Chloride mmol/L 102 102  --    CO2 mmol/L 26 26  --    Glucose mg/dL 169* 116*  --    Glucose, UA   --   --  Negative   BUN mg/dL 10 10  --    Creatinine mg/dL 0.8 0.8  --    Albumin g/dL 3.1* 3.3*  --    Total Bilirubin mg/dL 0.3 0.2  --    Alkaline Phosphatase U/L 91 99  --    AST U/L 14 18  --    ALT U/L 13 13  --    Magnesium mg/dL  --  2.1  --        Drug levels (last 3 results):  No results for input(s): "VANCOMYCINRA", "VANCORANDOM", "VANCOMYCINPE", "VANCOPEAK", " ""VANCOMYCINTR", "VANCOTROUGH" in the last 72 hours.    Microbiologic Results:  Microbiology Results (last 7 days)       Procedure Component Value Units Date/Time    Blood culture x two cultures. Draw prior to antibiotics. [[id number]] Collected: 09/09/24 1258    Order Status: Completed Specimen: Blood from Peripheral, Antecubital, Right Updated: 09/09/24 2115     Blood Culture, Routine No Growth to date    Narrative:      Aerobic and anaerobic    Blood culture x two cultures. Draw prior to antibiotics. [[id number]] Collected: 09/09/24 1258    Order Status: Completed Specimen: Blood from Peripheral, Antecubital, Left Updated: 09/09/24 2115     Blood Culture, Routine No Growth to date    Narrative:      Aerobic and anaerobic    Fungus culture [[id number]] Collected: 09/09/24 2047    Order Status: Sent Specimen: Respiratory from Sputum Updated: 09/09/24 2047    Culture, Respiratory with Gram Stain [[id number]] Collected: 09/09/24 2042    Order Status: Sent Specimen: Sputum, Expectorated Updated: 09/09/24 2042    AFB Culture & Smear (Collect Today) [[id number]] Collected: 09/09/24 2042    Order Status: Sent Specimen: Respiratory from Sputum, Induced Updated: 09/09/24 2042            "

## 2024-09-10 NOTE — SUBJECTIVE & OBJECTIVE
"Past Medical History:   Diagnosis Date    Anemia        Past Surgical History:   Procedure Laterality Date    BREAST BIOPSY Left 04/19/2021    TONSILLECTOMY  1983       Review of patient's allergies indicates:  No Known Allergies    Medications:  No medications prior to admission.     Antibiotics (From admission, onward)      Start     Stop Route Frequency Ordered    09/10/24 0830  vancomycin 750 mg in D5W 250 mL IVPB (admixture device)         -- IV Every 12 hours (non-standard times) 09/09/24 2118 09/09/24 1930  vancomycin (VANCOCIN) 1,000 mg in D5W 250 mL IVPB (admixture device)         -- IV Once 09/09/24 1746 09/09/24 1839  vancomycin - pharmacy to dose  (vancomycin IVPB (PEDS and ADULTS))        Placed in "And" Linked Group    -- IV pharmacy to manage frequency 09/09/24 1740 09/09/24 1830  piperacillin-tazobactam (ZOSYN) 4.5 g in D5W 100 mL IVPB (MB+)         -- IV Every 8 hours (non-standard times) 09/09/24 1740          Antifungals (From admission, onward)      None          Antivirals (From admission, onward)      None             Immunization History   Administered Date(s) Administered    COVID-19, MRNA, LN-S, PF (Pfizer) (Purple Cap) 11/18/2021    Influenza 10/15/2018, 10/18/2023    Influenza A (H1N1) 2009 Monovalent - IM 01/05/2010    Tdap 01/27/2023       Family History       Problem Relation (Age of Onset)    Cancer Father, Maternal Grandmother    Coronary artery disease Father, Paternal Grandmother, Maternal Grandfather    Diabetes Father    Heart attack Father    Hyperlipidemia Father, Mother    Hypertension Father    Prostate cancer Father    Stroke Father          Social History     Socioeconomic History    Marital status:    Tobacco Use    Smoking status: Never    Smokeless tobacco: Never   Substance and Sexual Activity    Alcohol use: Yes     Alcohol/week: 1.0 standard drink of alcohol     Types: 1 Drinks containing 0.5 oz of alcohol per week     Comment: social     Drug use: No "    Sexual activity: Yes     Partners: Male     Birth control/protection: None     Social Determinants of Health     Financial Resource Strain: Low Risk  (9/10/2024)    Overall Financial Resource Strain (CARDIA)     Difficulty of Paying Living Expenses: Not very hard   Food Insecurity: No Food Insecurity (9/10/2024)    Hunger Vital Sign     Worried About Running Out of Food in the Last Year: Never true     Ran Out of Food in the Last Year: Never true   Transportation Needs: No Transportation Needs (9/10/2024)    TRANSPORTATION NEEDS     Transportation : No   Physical Activity: Insufficiently Active (9/10/2024)    Exercise Vital Sign     Days of Exercise per Week: 3 days     Minutes of Exercise per Session: 30 min   Stress: No Stress Concern Present (9/10/2024)    [employer] - Occupational Stress Questionnaire     Feeling of Stress : Not at all   Housing Stability: Low Risk  (9/10/2024)    Housing Stability Vital Sign     Unable to Pay for Housing in the Last Year: No     Homeless in the Last Year: No     Review of Systems   Constitutional:  Negative for activity change, diaphoresis and fever.   HENT:  Negative for congestion and rhinorrhea.    Eyes:  Negative for discharge and redness.   Respiratory:  Negative for shortness of breath and wheezing.         Continues to cough up sputum; otherwise negative.    Cardiovascular:  Negative for chest pain, palpitations and leg swelling.   Gastrointestinal: Negative.    Endocrine: Negative.    Genitourinary: Negative.    Musculoskeletal: Negative.    Skin:  Negative for pallor, rash and wound.   Allergic/Immunologic: Negative.    Neurological: Negative.    Hematological: Negative.    Psychiatric/Behavioral: Negative.       Objective:     Vital Signs (Most Recent):  Temp: 98.2 °F (36.8 °C) (09/10/24 1252)  Pulse: 77 (09/10/24 1252)  Resp: 16 (09/10/24 1252)  BP: (!) 109/59 (09/10/24 1252)  SpO2: 98 % (09/10/24 1252) Vital Signs (24h Range):  Temp:   [98.1 °F (36.7 °C)-98.2 °F (36.8 °C)] 98.2 °F (36.8 °C)  Pulse:  [67-90] 77  Resp:  [16-20] 16  SpO2:  [96 %-100 %] 98 %  BP: (107-118)/(56-70) 109/59     Weight: 55.3 kg (122 lb)  Body mass index is 21.61 kg/m².    Estimated Creatinine Clearance: 74.2 mL/min (based on SCr of 0.8 mg/dL).     Physical Exam  Vitals and nursing note reviewed.   Constitutional:       General: She is not in acute distress.     Appearance: Normal appearance. She is not ill-appearing or diaphoretic.   HENT:      Head: Normocephalic and atraumatic.      Right Ear: Tympanic membrane normal.      Left Ear: Tympanic membrane normal.      Mouth/Throat:      Mouth: Mucous membranes are moist.      Pharynx: Oropharynx is clear.   Eyes:      General:         Left eye: No discharge.   Cardiovascular:      Rate and Rhythm: Normal rate and regular rhythm.      Pulses: Normal pulses.      Heart sounds: Normal heart sounds. No murmur heard.     No friction rub.   Pulmonary:      Effort: Pulmonary effort is normal. No respiratory distress.      Breath sounds: Normal breath sounds. No wheezing.   Abdominal:      General: Abdomen is flat. There is no distension.      Palpations: Abdomen is soft.      Tenderness: There is no abdominal tenderness.   Musculoskeletal:         General: Normal range of motion.      Cervical back: Normal range of motion and neck supple.   Skin:     General: Skin is warm.      Coloration: Skin is not jaundiced.   Neurological:      General: No focal deficit present.      Mental Status: She is alert and oriented to person, place, and time.          Significant Labs: All pertinent labs within the past 24 hours have been reviewed.    Significant Imaging: I have reviewed all pertinent imaging results/findings within the past 24 hours.

## 2024-09-10 NOTE — DISCHARGE SUMMARY
[facility] - Stepdown Flex ([place] [id number])  [facility] Medicine  Discharge Summary      Patient Name: Becka Carlson  MRN: [id number]  SARAH: [id number]  Patient Class: OP- Observation  Admission Date: 9/9/2024  Hospital Length of Stay: 0 days  Discharge Date and Time:  09/10/2024 3:27 PM  Attending Physician: Liz Esparza MD   Discharging Provider: Joe Garber MD  Primary Care Provider: Jake Kendrick MD  [facility] Medicine Team: [facility] MED 2 Joe Garber MD  Primary Care Team: [facility] MED 2    HPI:   This morning she woke up on the floor. Last thing she remembered was putting a towel on after showering. When she woke up on the ground she woke up she was still had wet towels on from the shower.    Patient reports that for 3 weeks ago, she had an upper respiratory symptoms and malaise, which then progressed to a cough. Sputum production with pinkish phlegm that has now changed to yellowish phlegm. OTC pseudofed provided relief. Cough has improved in frequency.     Reports heaviness in the head, low grade fevers for the last week (100.5 max), night sweats, localized pain on L posterior mid-chest (first time this AM).     Went to [facility] and returned last Tuesday after 1 week trip. Hike for 4 days straight, which involved lakes, caves (crawling through) and glaciers. After caving she threw up NB emesis. Vibratory sensation while throwing up. Denies noticing any bats or lizards while traveling. Partner was on the travel as well; feels a little malaise.     Cave on 08/30. Denies chronic nasal congestion, rash, weight loss, abdominal pain.  DAD [place].  Grandma with lung cancer from heavy cig.    Steroid injection in the knee this week bilaterally. Saturday PO doxy 2 doses.      * No surgery found *      Hospital Course:   46 yo with cavitary lung lesion consistent with lung abscess. TB less likely given lack of close contacts. She did have contacts with viral URI symptoms so  "post viral PNA is high on differential. She was splelunking in [place] so consider histo, blasto less likely.    Following vanc/zosyn administration overnight and collection of lab samples for certain infectious disease r/o / work up, discharged to home w/ 6 weeks oral augmentin/doxy. Pulm recs while inpatient as below. Would f/u on pending lab workup outpatient. ID recs pending, would f/u w/ recs while outpatient.      PULM RECS:    In my opinion, she does not need TB r/o and can be discharged with a long course of Abx, would recommend doxycycline and Augmentin for  4-6 weeks based on repeat imaging and clinical course.   Obtain MRSA nasal PCR to determine if MRSA coverage can be dropped.   I have set her up with pulmonary follow up in the [facility] for 9/20/24. Timing of repeat imaging can be determine at that visit.  I counseled her on risk of developing a pleural effusion and if she develops worsening pain, shortness or breath or overall decline to return to the ED.   Counseled her that this is extremely unlikely to be malignancy as that was one of her concerns and that we will repeat imaging in the future.   Management of her pleuritic pain per primary.        Goals of Care Treatment Preferences:  Code Status: Full Code      SDOH Screening:  The patient was screened for utility difficulties, food insecurity, transport difficulties, housing insecurity, and interpersonal safety and there were no concerns identified this admission.     Consults:   Consults (From admission, onward)          Status Ordering Provider     Pharmacy to dose Vancomycin consult  Once        Provider:  (Not yet assigned)   Placed in "And" Linked Group    Acknowledged JONATAN TILLMAN     Inpatient consult to Pulmonology  Once        Provider:  (Not yet assigned)    Completed JONATAN TILLMAN     Inpatient consult to Infectious Diseases  Once        Provider:  (Not yet assigned)    Acknowledged JONATAN TILLMAN      "       Pulmonary  * Pneumonia with cavity of lung  Pleuritic chest pain.  Bandemia.  Thrombocytosis (reactive).      Patient has a diagnosis of pneumonia. The cause of the pneumonia is suspected to be bacterial in etiology but organism is not known. The pneumonia is stable. The patient has the following signs/symptoms of pneumonia: cough, sputum production, and chest pain. The patient does not have a current oxygen requirement and the patient does not have a home oxygen requirement. I have reviewed the pertinent imaging. The following cultures have been collected: Blood cultures and Sputum culture.    PLAN:    - Pulmonology consulted given cavitary lung lesion in setting of multiple uncommon exposures. Recs as below.   - Etiology of cavitary lesion likely post-viral 2/2 recent bout of URI symptoms, potentially covid.   - In my opinion, she does not need TB r/o and can be discharged with a long course of Abx, would recommend doxycycline and Augmentin for  4-6 weeks based on repeat imaging and clinical course.   - Obtain MRSA nasal PCR to determine if MRSA coverage can be dropped.   - I have set her up with pulmonary follow up in the [facility] for 9/20/24. Timing of repeat imaging can be determine at that visit.  - I counseled her on risk of developing a pleural effusion and if she develops worsening pain, shortness or breath or overall decline to return to the ED.   -Counseled her that this is extremely unlikely to be malignancy as that was one of her concerns and that we will repeat imaging in the future.   -Management of her pleuritic pain per primary.     - IID consulted given cavitary lung lesion in setting of multiple uncommon exposures.  - Blood cultures, TB testing, sputum cultures all pending.   - Begin course IV abx (vanc/zosyn) in-patient with expected discharge tomorrow on 4-6 weeks of outpatient doxycycline+augmentin. Potential out-patient abx recs change pending culture results.      Final  Active Diagnoses:    Diagnosis Date Noted POA    PRINCIPAL PROBLEM:  Pneumonia with cavity of lung [J18.9, J98.4] 09/09/2024 Yes    Normocytic anemia [D64.9] 09/09/2024 Yes      Problems Resolved During this Admission:       Discharged Condition: stable    Disposition: Home or Self Care    Follow Up:    Patient Instructions:   No discharge procedures on file.    Significant Diagnostic Studies: N/A    Pending Diagnostic Studies:       Procedure Component Value Units Date/Time    Aspergillus antigen [[id number]] Collected: 09/10/24 1300    Order Status: Sent Lab Status: In process Updated: 09/10/24 1315    Specimen: Blood     Narrative:      Collection has been rescheduled by [facility] at 09/10/2024 11:52 Reason:   Patient unavailable, speaking with doctors, nurse johnson notified.    Histoplasma/Blastomyces antigen, serum [[id number]] Collected: 09/10/24 0319    Order Status: Sent Lab Status: In process Updated: 09/10/24 0459    Specimen: Blood     Quantiferon Gold TB [[id number]] Collected: 09/10/24 1300    Order Status: Sent Lab Status: In process Updated: 09/10/24 1315    Specimen: Blood     Narrative:      Collection has been rescheduled by [facility] at 09/10/2024 11:52 Reason:   Patient unavailable, speaking with doctors, nurse johnson notified.  Collection has been rescheduled by T at 09/10/2024 11:52 Reason:   Patient unavailable, speaking with doctors, nurse johnson notified.  Collection has been rescheduled by [facility] at 09/10/2024 11:52 Reason:   Patient unavailable, speaking with doctors, nurse johnson notified.  Collection has been rescheduled by [facility] at 09/10/2024 11:52 Reason:   Patient unavailable, speaking with doctors, nurse johnson notified.           Medications:  Reconciled Home Medications:      Medication List        START taking these medications      amoxicillin-clavulanate 875-125mg 875-125 mg per tablet  Commonly known as: AUGMENTIN  Take 1 tablet by mouth every 12 (twelve) hours.     doxycycline 100 MG  Cap  Commonly known as: VIBRAMYCIN  Take 1 capsule (100 mg total) by mouth 2 (two) times daily.            STOP taking these medications      acetaminophen 650 MG Tbsr  Commonly known as: TYLENOL     diclofenac sodium 1 % Gel  Commonly known as: VOLTAREN     ivermectin 1 % Crea  Commonly known as: SOOLANTRA     multivitamin capsule              Indwelling Lines/Drains at time of discharge:   Lines/Drains/Airways       None                   Time spent on the discharge of patient: 30 minutes         Joe Garber MD  Department of Hospital Medicine  [facility] - Stepdown Flex ([place]

## 2024-09-11 ENCOUNTER — PATIENT OUTREACH (OUTPATIENT)
Dept: ADMINISTRATIVE | Facility: CLINIC | Age: 44
End: 2024-09-11
Payer: COMMERCIAL

## 2024-09-11 LAB
ACID FAST MOD KINY STN SPEC: NORMAL
HISTOPLASMA/BLASTOMYCES AG VALUE: NOT DETECTED NG/ML
HISTOPLASMA/BLASTOMYCES RESULT: NOT DETECTED
MYCOBACTERIUM SPEC QL CULT: NORMAL

## 2024-09-11 NOTE — PROGRESS NOTES
C3 nurse spoke with Becka Carlson for a TCC post hospital discharge follow up call. The patient has a scheduled HOSFU appointment with Laura Nguyen on 09/17/24 @ 1065.

## 2024-09-12 LAB
ACID FAST MOD KINY STN SPEC: NORMAL
BACTERIA SPEC AEROBE CULT: NORMAL
BACTERIA SPEC AEROBE CULT: NORMAL
CRYPTOC AG SER QL LA: NEGATIVE
GALACTOMANNAN AG SERPL IA-ACNC: <0.5 INDEX
GAMMA INTERFERON BACKGROUND BLD IA-ACNC: 0.01 IU/ML
GRAM STN SPEC: NORMAL
M TB IFN-G CD4+ BCKGRND COR BLD-ACNC: 0 IU/ML
M TB IFN-G CD4+ BCKGRND COR BLD-ACNC: 0.02 IU/ML
MITOGEN IGNF BCKGRD COR BLD-ACNC: 2.32 IU/ML
MYCOBACTERIUM SPEC QL CULT: NORMAL
TB GOLD PLUS: NEGATIVE

## 2024-09-12 NOTE — PLAN OF CARE
[facility] - Stepdown Flex ([place])  Discharge Final Note    Primary Care Provider: Jake Kendrick MD    Expected Discharge Date: 9/10/2024    Patient discharged to HOME via personal transportation.     Patient's bedside nurse and pt notified of the above.    Discharge Plan A and Plan B have been determined by review of patient's clinical status, future medical and therapeutic needs, and coverage/benefits for post-acute care in coordination with multidisciplinary team members.        Final Discharge Note (most recent)       Final Note - 09/12/24 0918          Final Note    Assessment Type Final Discharge Note     Anticipated Discharge Disposition Home or Self Care     What phone number can be called within the next 1-3 days to see how you are doing after discharge? [phone]     Hospital Resources/Appts/Education Provided Appointments scheduled and added to AVS        Post-Acute Status    Post-Acute Authorization Other     Coverage [facility] SELECT TIERED     Discharge Delays None known at this time                     Important Message from Medicare                 Future Appointments   Date Time Provider Department Center   9/17/2024  9:00 AM LAB, APPOINTMENT [facility] LAB VNP [facility]   9/17/2024  9:45 AM [facility] [id number] 485 LB LIMIT [facility] XRAY OP [facility]   9/17/2024 11:30 AM Laura Nguyen MD [facility] INTL MD [facility]   9/20/2024  1:30 PM Mauri Givens MD [facility] PULMSVC [facility]   10/3/2024  4:30 PM Roe Boone MD [facility] ID [facility]   11/5/2024 11:00 AM Becka Ayoub MD [facility] WMN GRP [facility]   12/6/2024  8:00 AM SARA DE SOUZA MAMMO2 NOM MAMMO [facility]       CH scheduled post-discharge follow-up appointment and information added to AVS.     Pita Black MSW, CSW

## 2024-09-13 LAB — MRSA SPEC QL CULT: NORMAL

## 2024-09-14 LAB
BACTERIA BLD CULT: NORMAL
BACTERIA BLD CULT: NORMAL

## 2024-09-16 ENCOUNTER — TELEPHONE (OUTPATIENT)
Dept: INTERNAL MEDICINE | Facility: CLINIC | Age: 44
End: 2024-09-16
Payer: COMMERCIAL

## 2024-09-16 LAB — FUNGUS SPEC CULT: ABNORMAL

## 2024-09-16 NOTE — PROGRESS NOTES
"Hillary Boone.     I wanted to bring to your attention today's positive sputum result for Ms. Carlson for "fungus".    I am seeing Ms. Carlson tomorrow for a f/u with a CBC and XR. I have scheduled her repeat CT right before she sees you.    She is on Augmentin and Bactrim.     If you have any recommendations for me about this sputum finding I would appreciate it.    Her appointment with you is in a couple of weeks, thank you.    Laura"

## 2024-09-17 ENCOUNTER — HOSPITAL ENCOUNTER (OUTPATIENT)
Dept: RADIOLOGY | Facility: HOSPITAL | Age: 44
Discharge: HOME | End: 2024-09-17
Attending: STUDENT IN AN ORGANIZED HEALTH CARE EDUCATION/TRAINING PROGRAM
Payer: COMMERCIAL

## 2024-09-17 ENCOUNTER — OFFICE VISIT (OUTPATIENT)
Dept: INTERNAL MEDICINE | Facility: CLINIC | Age: 44
End: 2024-09-17
Payer: COMMERCIAL

## 2024-09-17 VITALS
BODY MASS INDEX: 20.2 KG/M2 | HEIGHT: 63 IN | HEART RATE: 66 BPM | WEIGHT: 114 LBS | SYSTOLIC BLOOD PRESSURE: 96 MMHG | DIASTOLIC BLOOD PRESSURE: 64 MMHG

## 2024-09-17 DIAGNOSIS — J18.9 PNEUMONIA WITH CAVITY OF LUNG: ICD-10-CM

## 2024-09-17 DIAGNOSIS — D64.9 NORMOCYTIC ANEMIA: ICD-10-CM

## 2024-09-17 DIAGNOSIS — J98.4 PNEUMONIA WITH CAVITY OF LUNG: ICD-10-CM

## 2024-09-17 DIAGNOSIS — Z09 HOSPITAL DISCHARGE FOLLOW-UP: Primary | ICD-10-CM

## 2024-09-17 PROCEDURE — 3078F DIAST BP <80 MM HG: CPT | Mod: CPTII,S$GLB,, | Performed by: STUDENT IN AN ORGANIZED HEALTH CARE EDUCATION/TRAINING PROGRAM

## 2024-09-17 PROCEDURE — 71046 X-RAY EXAM CHEST 2 VIEWS: CPT | Mod: 26,,, | Performed by: RADIOLOGY

## 2024-09-17 PROCEDURE — 99999 PR PBB SHADOW E&M-EST. PATIENT-LVL III: CPT | Mod: PBBFAC,,, | Performed by: STUDENT IN AN ORGANIZED HEALTH CARE EDUCATION/TRAINING PROGRAM

## 2024-09-17 PROCEDURE — 3008F BODY MASS INDEX DOCD: CPT | Mod: CPTII,S$GLB,, | Performed by: STUDENT IN AN ORGANIZED HEALTH CARE EDUCATION/TRAINING PROGRAM

## 2024-09-17 PROCEDURE — 1160F RVW MEDS BY RX/DR IN RCRD: CPT | Mod: CPTII,S$GLB,, | Performed by: STUDENT IN AN ORGANIZED HEALTH CARE EDUCATION/TRAINING PROGRAM

## 2024-09-17 PROCEDURE — 99213 OFFICE O/P EST LOW 20 MIN: CPT | Mod: S$GLB,,, | Performed by: STUDENT IN AN ORGANIZED HEALTH CARE EDUCATION/TRAINING PROGRAM

## 2024-09-17 PROCEDURE — 3074F SYST BP LT 130 MM HG: CPT | Mod: CPTII,S$GLB,, | Performed by: STUDENT IN AN ORGANIZED HEALTH CARE EDUCATION/TRAINING PROGRAM

## 2024-09-17 PROCEDURE — 71046 X-RAY EXAM CHEST 2 VIEWS: CPT | Mod: TC,FY

## 2024-09-17 PROCEDURE — 1159F MED LIST DOCD IN RCRD: CPT | Mod: CPTII,S$GLB,, | Performed by: STUDENT IN AN ORGANIZED HEALTH CARE EDUCATION/TRAINING PROGRAM

## 2024-09-17 NOTE — PROGRESS NOTES
Subjective:       Patient ID: Becka Carlson is a 44 y.o. female.    Chief Complaint: Follow-up    HPI    Becka Carlson is a 44 y.o. female , English speaking, with a history of:  Normocytic anemia  Pneumonia with lung cavity (sept 2024)      [Local Patient]  Originally from [facility]  Lives in: [place] [address]       Patient comes to the clinic for a hospital discharge follow up.    Patient was recently hospitalized in observation from 09/09/2024 to 09/10/2024 due to cavitary pneumonia and syncope.  Several studies were completed during her hospital stay including cultures and images.  She was seen by Neurology and Infectious diseases.    Most of the infectious diseases workup was negative as seen on the test results below.  The only positive results was a sputum culture that showed CANDIDA DUBLINIENSIS . Test result was reported to ID.    She is on week 2 of antibiotics (Augmentin and Bactrim), she is also taking daily probiotic and multivitamin, doing well, improved, she denies fever, chills, fatigue.  She is almost back to her normal self.  She has not resumed working yet.  She is walking her dog and she does not exhibit any signs of shortness a breath on mild exertion.    Today's chest x-ray looks improved as per last weeks.  Pending report.    Patient requests to transfer her primary care services to my practice.    No other complaints or concerns today    Latest PCP visits:      9/9/24 Cavitary pneumonia.  Referral to ER    Changes in health or medications: No    Specialists visits and recommendations:        H/o ER or Urgent care visits:   NO    H/o Hospitalizations:  NO    H/o falls: None     Life events / lifestyle:   Nothing new      Most recent / available laboratories reviewed with the patient:    Recent Labs   Lab 09/09/24  1258 09/10/24  0319 09/17/24  0912   WBC 18.02 H 11.32 6.56   Hemoglobin 10.9 L 10.5 L 10.6 L   Hematocrit 35.3 L 33.1 L 32.8 L   MCV 91 91 89   Platelets 555 H  523 H 433       Recent Labs   Lab 09/09/24  1031 09/09/24  1258 09/10/24  0319   Glucose 169 H 116 H 100   Sodium 136 137 138   Potassium 4.0 3.8 4.1   BUN 10 10 9   Creatinine 0.8 0.8 0.8   Total Bilirubin 0.3 0.2 0.2   AST 14 18 13   ALT 13 13 13             Recent Labs   Lab 02/07/23  0654 04/12/24  0745   Cholesterol 140 159   Triglycerides 54 47   HDL 70 71   LDL Cholesterol 57 75   Non HDL Chol. (LDL+VLDL) 70 88       Recent Labs   Lab 02/07/23  0654 04/12/24  0745   TSH 1.59 1.48       Recent Labs   Lab 02/07/23  0654 09/09/24  1423   HIV 1/2 Ag/Ab  --  Non-reactive   Hepatitis C Ab NON-REACTIVE Non-reactive       Lab Visit on 09/17/2024   Component Date Value Ref Range Status    WBC 09/17/2024 6.56  3.90 - 12.70 K/uL Final    RBC 09/17/2024 3.68 (L)  4.00 - 5.40 M/uL Final    Hemoglobin 09/17/2024 10.6 (L)  12.0 - 16.0 g/dL Final    Hematocrit 09/17/2024 32.8 (L)  37.0 - 48.5 % Final    MCV 09/17/2024 89  82 - 98 fL Final    MCH 09/17/2024 28.8  27.0 - 31.0 pg Final    MCHC 09/17/2024 32.3  32.0 - 36.0 g/dL Final    RDW 09/17/2024 12.2  11.5 - 14.5 % Final    Platelets 09/17/2024 433  150 - 450 K/uL Final    MPV 09/17/2024 9.9  9.2 - 12.9 fL Final    Immature Granulocytes 09/17/2024 0.5  0.0 - 0.5 % Final    Gran # (ANC) 09/17/2024 4.2  1.8 - 7.7 K/uL Final    Immature Grans (Abs) 09/17/2024 0.03  0.00 - 0.04 K/uL Final    Comment: Mild elevation in immature granulocytes is non specific and   can be seen in a variety of conditions including stress response,   acute inflammation, trauma and pregnancy. Correlation with other   laboratory and clinical findings is essential.      Lymph # 09/17/2024 1.8  1.0 - 4.8 K/uL Final    Mono # 09/17/2024 0.4  0.3 - 1.0 K/uL Final    Eos # 09/17/2024 0.1  0.0 - 0.5 K/uL Final    Baso # 09/17/2024 0.05  0.00 - 0.20 K/uL Final    nRBC 09/17/2024 0  0 /100 WBC Final    Gran % 09/17/2024 63.8  38.0 - 73.0 % Final    Lymph % 09/17/2024 27.4  18.0 - 48.0 % Final    Mono %  09/17/2024 6.7  4.0 - 15.0 % Final    Eosinophil % 09/17/2024 0.8  0.0 - 8.0 % Final    Basophil % 09/17/2024 0.8  0.0 - 1.9 % Final    Differential Method 09/17/2024 Automated   Final   Admission on 09/09/2024, Discharged on 09/10/2024   Component Date Value Ref Range Status    Blood Culture, Routine 09/09/2024 No growth after 5 days.   Final    Blood Culture, Routine 09/09/2024 No growth after 5 days.   Final    WBC 09/09/2024 18.02 (H)  3.90 - 12.70 K/uL Final    RBC 09/09/2024 3.87 (L)  4.00 - 5.40 M/uL Final    Hemoglobin 09/09/2024 10.9 (L)  12.0 - 16.0 g/dL Final    Hematocrit 09/09/2024 35.3 (L)  37.0 - 48.5 % Final    MCV 09/09/2024 91  82 - 98 fL Final    MCH 09/09/2024 28.2  27.0 - 31.0 pg Final    MCHC 09/09/2024 30.9 (L)  32.0 - 36.0 g/dL Final    RDW 09/09/2024 12.1  11.5 - 14.5 % Final    Platelets 09/09/2024 555 (H)  150 - 450 K/uL Final    MPV 09/09/2024 9.6  9.2 - 12.9 fL Final    Immature Granulocytes 09/09/2024 0.5  0.0 - 0.5 % Final    Gran # (ANC) 09/09/2024 16.0 (H)  1.8 - 7.7 K/uL Final    Immature Grans (Abs) 09/09/2024 0.09 (H)  0.00 - 0.04 K/uL Final    Comment: Mild elevation in immature granulocytes is non specific and   can be seen in a variety of conditions including stress response,   acute inflammation, trauma and pregnancy. Correlation with other   laboratory and clinical findings is essential.      Lymph # 09/09/2024 0.9 (L)  1.0 - 4.8 K/uL Final    Mono # 09/09/2024 1.0  0.3 - 1.0 K/uL Final    Eos # 09/09/2024 0.0  0.0 - 0.5 K/uL Final    Baso # 09/09/2024 0.06  0.00 - 0.20 K/uL Final    nRBC 09/09/2024 0  0 /100 WBC Final    Gran % 09/09/2024 88.7 (H)  38.0 - 73.0 % Final    Lymph % 09/09/2024 4.9 (L)  18.0 - 48.0 % Final    Mono % 09/09/2024 5.5  4.0 - 15.0 % Final    Eosinophil % 09/09/2024 0.1  0.0 - 8.0 % Final    Basophil % 09/09/2024 0.3  0.0 - 1.9 % Final    Differential Method 09/09/2024 Automated   Final    Sodium 09/09/2024 137  136 - 145 mmol/L Final    Potassium  09/09/2024 3.8  3.5 - 5.1 mmol/L Final    Chloride 09/09/2024 102  95 - 110 mmol/L Final    CO2 09/09/2024 26  23 - 29 mmol/L Final    Glucose 09/09/2024 116 (H)  70 - 110 mg/dL Final    BUN 09/09/2024 10  6 - 20 mg/dL Final    Creatinine 09/09/2024 0.8  0.5 - 1.4 mg/dL Final    Calcium 09/09/2024 9.6  8.7 - 10.5 mg/dL Final    Total Protein 09/09/2024 7.9  6.0 - 8.4 g/dL Final    Albumin 09/09/2024 3.3 (L)  3.5 - 5.2 g/dL Final    Total Bilirubin 09/09/2024 0.2  0.1 - 1.0 mg/dL Final    Comment: For infants and newborns, interpretation of results should be based  on gestational age, weight and in agreement with clinical  observations.    Premature Infant recommended reference ranges:  Up to 24 hours.............<8.0 mg/dL  Up to 48 hours............<12.0 mg/dL  3-5 days..................<15.0 mg/dL  6-29 days.................<15.0 mg/dL      Alkaline Phosphatase 09/09/2024 99  55 - 135 U/L Final    AST 09/09/2024 18  10 - 40 U/L Final    ALT 09/09/2024 13  10 - 44 U/L Final    eGFR 09/09/2024 >60.0  >60 mL/min/1.73 m^2 Final    Anion Gap 09/09/2024 9  8 - 16 mmol/L Final    Specimen UA 09/09/2024 Urine, Clean Catch   Final    Color, UA 09/09/2024 Colorless (A)  Yellow, Straw, [place] Final    Appearance, UA 09/09/2024 Clear  Clear Final    pH, UA 09/09/2024 7.0  5.0 - 8.0 Final    Specific Gravity, UA 09/09/2024 1.010  1.005 - 1.030 Final    Protein, UA 09/09/2024 Negative  Negative Final    Comment: Recommend a 24 hour urine protein or a urine   protein/creatinine ratio if globulin induced proteinuria is  clinically suspected.      Glucose, UA 09/09/2024 Negative  Negative Final    Ketones, UA 09/09/2024 Negative  Negative Final    Bilirubin (UA) 09/09/2024 Negative  Negative Final    Occult Blood UA 09/09/2024 Negative  Negative Final    Nitrite, UA 09/09/2024 Negative  Negative Final    Leukocytes, UA 09/09/2024 Negative  Negative Final    QRS Duration 09/09/2024 80  ms Final    [facility] QTC Calculation 09/09/2024 457   ms Final    Magnesium 09/09/2024 2.1  1.6 - 2.6 mg/dL Final    Troponin I 09/09/2024 <0.006  0.000 - 0.026 ng/mL Final    Comment: The reference interval for Troponin I represents the 99th percentile   cutoff   for our facility and is consistent with 3rd generation assay   performance.      POC Preg Test, Ur 09/09/2024 Negative  Negative Final     Acceptable 09/09/2024 Yes   Final    HIV 1/2 Ag/Ab 09/09/2024 Non-reactive  Non-reactive Final    Hepatitis C Ab 09/09/2024 Non-reactive  Non-reactive Final    POC Lactate 09/09/2024 0.67  0.5 - 2.2 mmol/L Final    Sample 09/09/2024 VENOUS   Final    Site 09/09/2024 Other   Final    Allens Test 09/09/2024 N/A   Final    AFB Culture & Smear 09/09/2024 Culture in progress   Preliminary    AFB CULTURE STAIN 09/09/2024 No acid fast bacilli seen.   Final    LD 09/09/2024 277 (H)  110 - 260 U/L Final    Comment: Results are increased in hemolyzed samples.  *Result may be interfered by visible hemolysis      SARS-CoV-2 RNA, Amplification, Qual 09/09/2024 Negative  Negative Final    Comment: This test utilizes isothermal nucleic acid amplification technology   to   detect the SARS-CoV-2 RdRp nucleic acid segment. The analytical   sensitivity   (limit of detection) is 500 copies/swab.    A POSITIVE result is indicative of the presence of SARS-CoV-2 RNA;   clinical   correlation with patient history and other diagnostic information is   necessary to determine patient infection status.    A NEGATIVE result means that SARS-CoV-2 nucleic acids are not present   above   the limit of detection. A NEGATIVE result should be treated as   presumptive.   It does not rule out the possibility of COVID-19 and should not be   the sole   basis for treatment decisions.    If COVID-19 is strongly suspected based on clinical and exposure   history,   re-testing using an alternate molecular assay should be considered.    This test is Food and Drug Administration (FDA) approved.  Performance   characteristics of this has been independently verified by [facility] Department of Pat                           hology and Laboratory Medicine.      Respiratory Culture 09/09/2024 Normal respiratory booker   Final    Respiratory Culture 09/09/2024 No S aureus or Pseudomonas isolated.   Final    Gram Stain (Respiratory) 09/09/2024 <10 epithelial cells per low power field.   Final    Gram Stain (Respiratory) 09/09/2024 Few WBC's   Final    Gram Stain (Respiratory) 09/09/2024 Moderate Gram positive cocci   Final    QRS Duration 09/09/2024 88  ms Final    [facility] QTC Calculation 09/09/2024 465  ms Final    Fungus (Mycology) Culture 09/09/2024  (A)   Preliminary                    Value:ERIC DUBLINIENSIS  Rare      Phosphorus 09/10/2024 3.4  2.7 - 4.5 mg/dL Final    Magnesium 09/10/2024 2.2  1.6 - 2.6 mg/dL Final    Sodium 09/10/2024 138  136 - 145 mmol/L Final    Potassium 09/10/2024 4.1  3.5 - 5.1 mmol/L Final    Chloride 09/10/2024 103  95 - 110 mmol/L Final    CO2 09/10/2024 24  23 - 29 mmol/L Final    Glucose 09/10/2024 100  70 - 110 mg/dL Final    BUN 09/10/2024 9  6 - 20 mg/dL Final    Creatinine 09/10/2024 0.8  0.5 - 1.4 mg/dL Final    Calcium 09/10/2024 9.4  8.7 - 10.5 mg/dL Final    Total Protein 09/10/2024 7.1  6.0 - 8.4 g/dL Final    Albumin 09/10/2024 2.9 (L)  3.5 - 5.2 g/dL Final    Total Bilirubin 09/10/2024 0.2  0.1 - 1.0 mg/dL Final    Comment: For infants and newborns, interpretation of results should be based  on gestational age, weight and in agreement with clinical  observations.    Premature Infant recommended reference ranges:  Up to 24 hours.............<8.0 mg/dL  Up to 48 hours............<12.0 mg/dL  3-5 days..................<15.0 mg/dL  6-29 days.................<15.0 mg/dL      Alkaline Phosphatase 09/10/2024 89  55 - 135 U/L Final    AST 09/10/2024 13  10 - 40 U/L Final    ALT 09/10/2024 13  10 - 44 U/L Final    eGFR 09/10/2024 >60.0  >60 mL/min/1.73 m^2 Final     Anion Gap 09/10/2024 11  8 - 16 mmol/L Final    WBC 09/10/2024 11.32  3.90 - 12.70 K/uL Final    RBC 09/10/2024 3.63 (L)  4.00 - 5.40 M/uL Final    Hemoglobin 09/10/2024 10.5 (L)  12.0 - 16.0 g/dL Final    Hematocrit 09/10/2024 33.1 (L)  37.0 - 48.5 % Final    MCV 09/10/2024 91  82 - 98 fL Final    MCH 09/10/2024 28.9  27.0 - 31.0 pg Final    MCHC 09/10/2024 31.7 (L)  32.0 - 36.0 g/dL Final    RDW 09/10/2024 12.1  11.5 - 14.5 % Final    Platelets 09/10/2024 523 (H)  150 - 450 K/uL Final    MPV 09/10/2024 9.9  9.2 - 12.9 fL Final    Immature Granulocytes 09/10/2024 0.4  0.0 - 0.5 % Final    Gran # (ANC) 09/10/2024 9.2 (H)  1.8 - 7.7 K/uL Final    Immature Grans (Abs) 09/10/2024 0.05 (H)  0.00 - 0.04 K/uL Final    Comment: Mild elevation in immature granulocytes is non specific and   can be seen in a variety of conditions including stress response,   acute inflammation, trauma and pregnancy. Correlation with other   laboratory and clinical findings is essential.      Lymph # 09/10/2024 1.1  1.0 - 4.8 K/uL Final    Mono # 09/10/2024 0.9  0.3 - 1.0 K/uL Final    Eos # 09/10/2024 0.0  0.0 - 0.5 K/uL Final    Baso # 09/10/2024 0.03  0.00 - 0.20 K/uL Final    nRBC 09/10/2024 0  0 /100 WBC Final    Gran % 09/10/2024 81.1 (H)  38.0 - 73.0 % Final    Lymph % 09/10/2024 9.8 (L)  18.0 - 48.0 % Final    Mono % 09/10/2024 8.0  4.0 - 15.0 % Final    Eosinophil % 09/10/2024 0.4  0.0 - 8.0 % Final    Basophil % 09/10/2024 0.3  0.0 - 1.9 % Final    Differential Method 09/10/2024 Automated   Final    Histoplasma/Blastomyces Result 09/10/2024 Not Detected  Not Detected Final    Comment: No antigen from Histoplasma or Blastomyces detected.  False   negative results may occur depending on extent of disease,   and/or site of infection.  Repeat testing on a new specimen   if clinically indicated.      Histoplasma/Blastomyces Ag Value 09/10/2024 Not Detected  ng/mL Final    Comment: -------------------ADDITIONAL  INFORMATION-------------------  This test was developed and its performance characteristics   determined by [facility] in a manner consistent with CLIA   requirements. This test has not been cleared or approved by   the U.S. Food and Drug Administration.    Test Performed by:  [facility] - [place] [id number]  : Cris Castro Ph.D.; CLIA# [id number]      Procalcitonin 09/10/2024 0.10  <0.25 ng/mL Final    Comment: A concentration < 0.25 ng/mL represents a low risk of bacterial   infection.  Procalcitonin may not be accurate among patients with localized   infection, recent trauma or major surgery, immunosuppressed state,   invasive fungal infection, renal dysfunction. Decisions regarding   initiation or continuation of antibiotic therapy should not be based   solely on procalcitonin levels.      MRSA SCREEN BY PCR 09/10/2024 Not Detected  Negative Final    AFB Culture & Smear 09/10/2024 Culture in progress   Preliminary    AFB CULTURE STAIN 09/10/2024 No acid fast bacilli seen.   Final    MRSA Surveillance Screen 09/10/2024 No MRSA isolated   Final    Aspergillus Antigen 09/10/2024 <0.500  <0.5 index Final    Comment: -------------------ADDITIONAL INFORMATION-------------------  This is a qualitative test and the resulted index value is   not indicative of disease severity.  Serial testing is   recommended for patients at high risk for invasive   aspergillosis.  This assay was performed using the FDA-cleared [employer]   Platelia Aspergillus Galactomannan EIA.    Test Performed by:  [facility] - [address] [address]  : Cris Castro Ph.D.; CLIA# [id number]      Cryptococcal Ag, Blood 09/10/2024 Negative  Negative Final    NIL 09/10/2024 0.28246  IU/mL Final    Comment: The Nil tube value is used to determine if the patient   has a preexisting immune response  which could cause a   false-positive reading on the test.   For a test to be valid, the NIL tube must have a value   of less than or equal to 8.0 IU/mL.  The mitogen control tube is used to assure the patient   has a healthy immune status and also serves as a control   for correct blood handling and incubation. It is used to   detect false negative readings.   The TB antigen tubes are coated with the M. tuberculosis   specific antigens. For a test to be considered positive,   at least one of the TB antigen tube values minus the Nil   tube value must be greater than or equal to 0.35 IU/mL   and be greater than or equal to 25% of the Nil tube value.  Diagnosing or excluding tuberculosis disease, and assessing   the probability of LTNI, requires a combination of   epidemiological, historical, medical, and diagnostic   findings that should be considered when interpreting   the test results.       TB1 - Nil 09/10/2024 0.003  IU/mL Final    TB2 - Nil 09/10/2024 0.021  IU/mL Final    Mitogen - Nil 09/10/2024 2.322  IU/mL Final    TB Gold Plus 09/10/2024 Negative  Negative Final    M. tuberculosis infection NOT likely.   Lab Visit on 09/09/2024   Component Date Value Ref Range Status    WBC 09/09/2024 18.26 (H)  3.90 - 12.70 K/uL Final    RBC 09/09/2024 3.67 (L)  4.00 - 5.40 M/uL Final    Hemoglobin 09/09/2024 10.7 (L)  12.0 - 16.0 g/dL Final    Hematocrit 09/09/2024 32.9 (L)  37.0 - 48.5 % Final    MCV 09/09/2024 90  82 - 98 fL Final    MCH 09/09/2024 29.2  27.0 - 31.0 pg Final    MCHC 09/09/2024 32.5  32.0 - 36.0 g/dL Final    RDW 09/09/2024 11.9  11.5 - 14.5 % Final    Platelets 09/09/2024 483 (H)  150 - 450 K/uL Final    MPV 09/09/2024 9.4  9.2 - 12.9 fL Final    Immature Granulocytes 09/09/2024 0.6 (H)  0.0 - 0.5 % Final    Gran # (ANC) 09/09/2024 16.5 (H)  1.8 - 7.7 K/uL Final    Immature Grans (Abs) 09/09/2024 0.11 (H)  0.00 - 0.04 K/uL Final    Comment: Mild elevation in immature granulocytes is non specific and    can be seen in a variety of conditions including stress response,   acute inflammation, trauma and pregnancy. Correlation with other   laboratory and clinical findings is essential.      Lymph # 09/09/2024 0.7 (L)  1.0 - 4.8 K/uL Final    Mono # 09/09/2024 0.9  0.3 - 1.0 K/uL Final    Eos # 09/09/2024 0.0  0.0 - 0.5 K/uL Final    Baso # 09/09/2024 0.07  0.00 - 0.20 K/uL Final    nRBC 09/09/2024 0  0 /100 WBC Final    Gran % 09/09/2024 90.4 (H)  38.0 - 73.0 % Final    Lymph % 09/09/2024 3.6 (L)  18.0 - 48.0 % Final    Mono % 09/09/2024 4.9  4.0 - 15.0 % Final    Eosinophil % 09/09/2024 0.1  0.0 - 8.0 % Final    Basophil % 09/09/2024 0.4  0.0 - 1.9 % Final    Differential Method 09/09/2024 Automated   Final    Sodium 09/09/2024 136  136 - 145 mmol/L Final    Potassium 09/09/2024 4.0  3.5 - 5.1 mmol/L Final    Chloride 09/09/2024 102  95 - 110 mmol/L Final    CO2 09/09/2024 26  23 - 29 mmol/L Final    Glucose 09/09/2024 169 (H)  70 - 110 mg/dL Final    BUN 09/09/2024 10  6 - 20 mg/dL Final    Creatinine 09/09/2024 0.8  0.5 - 1.4 mg/dL Final    Calcium 09/09/2024 9.3  8.7 - 10.5 mg/dL Final    Total Protein 09/09/2024 7.4  6.0 - 8.4 g/dL Final    Albumin 09/09/2024 3.1 (L)  3.5 - 5.2 g/dL Final    Total Bilirubin 09/09/2024 0.3  0.1 - 1.0 mg/dL Final    Comment: For infants and newborns, interpretation of results should be based  on gestational age, weight and in agreement with clinical  observations.    Premature Infant recommended reference ranges:  Up to 24 hours.............<8.0 mg/dL  Up to 48 hours............<12.0 mg/dL  3-5 days..................<15.0 mg/dL  6-29 days.................<15.0 mg/dL      Alkaline Phosphatase 09/09/2024 91  55 - 135 U/L Final    AST 09/09/2024 14  10 - 40 U/L Final    ALT 09/09/2024 13  10 - 44 U/L Final    eGFR 09/09/2024 >60.0  >60 mL/min/1.73 m^2 Final    Anion Gap 09/09/2024 8  8 - 16 mmol/L Final    CRP 09/09/2024 118.3 (H)  0.0 - 8.2 mg/L Final   ]    Current  "medications:    Current Outpatient Medications:     amoxicillin-clavulanate 875-125mg (AUGMENTIN) 875-125 mg per tablet, Take 1 tablet by mouth every 12 (twelve) hours., Disp: 42 tablet, Rfl: 1    sulfamethoxazole-trimethoprim 800-160mg (BACTRIM DS) 800-160 mg Tab, Take 1 tablet by mouth 2 (two) times daily. for 21 days, Disp: 42 tablet, Rfl: 0    UNABLE TO FIND, medication name: PROBIOTIC, Disp: , Rfl:       Healthcare Maintenance:  Colon cancer screening:         Vaccinations:        Tetanus: 2023       Pneumonia: no       Zoster: n/a       Influenza: no       COVID vaccination: completed 5    Depression screening: PHQ2 score = 0    Annual Wellness visit approx. Month: MARCH ROS 11-point review of systems done. Negative except for detailed in the HPI.        Objective:      BP 96/64   Pulse 66   Ht 5' 3" (1.6 m)   Wt 51.7 kg (113 lb 15.7 oz)   LMP 08/30/2024 (Exact Date)   BMI 20.19 kg/m²      Physical Exam   General appearance: Good general aspect, NAD, conversant   Eyes and HEENT: Normal sclerae, moist mucous membranes, no thyromegaly or lymphadenopathy  Respiratory: No work of breathing, clear to auscultation bilaterally. No rales, rhonchi, wheezing, or rubs.  Cardiovascular: PMI not displaced. RRR. Normal S1, S2. No murmurs, rubs or gallops.  Abdomen and : Soft, non-tender, nondistended, BS, no hepatosplenomegaly, no masses.  Extremities: no edemas, no extremity lymphadenopathy, no clubbing, no cyanosis.  Nervous System: Alert and oriented x 3, good concentration, no deficits.  Skin: Normal temperature, turgor and texture; no rash, ulcers or subcutaneous nodules  Psych: Appropriate affect, alert and oriented to person, place and time          Assessment:       1. Hospital discharge follow-up  Assessment & Plan:  S/P observation stay due to pneumonia with lung cavity.    Patient is stable, improving.  Patient is on antibiotic      2. Pneumonia with cavity of lung  Assessment & Plan:  Clinical and " radiological improvement seen today.    Patient is asymptomatic.    Continue scheduled follow-ups with pulmonology and Infectious diseases.    Continue Augmentin and Bactrim.  CT chest scheduled      3. Normocytic anemia  Assessment & Plan:  Lab Results   Component Value Date    WBC 6.56 09/17/2024    HGB 10.6 (L) 09/17/2024    HCT 32.8 (L) 09/17/2024    MCV 89 09/17/2024     09/17/2024       Unchanged.    Continue oral iron supplementation.    We will reassess in 3 months    Orders:  -     CBC W/ AUTO DIFFERENTIAL; Future; Expected date: 12/17/2024  -     Ferritin; Future; Expected date: 12/17/2024  -     Iron and TIBC; Future; Expected date: 12/17/2024  -     Vitamin B12; Future; Expected date: 12/17/2024       Summary of orders / plan:       Continue ATB  Continue oral iron and probiotics  Anemia panel ordered for f/u visit    Patient requsets to switch to our clinic for primary care services  I will assume as PCP            Patient Instructions   Continue follow up with PCP  Continue taking daily multivitamin  You may resume physical activity as tolerated  I am recommending repeat CBC and anemia studies in 3 months  Follow specialists recommendations       Problem list updated  Medications reconciled  Education provided  Lifestyle recommendations given  AVS generated, explained, and sent to the patient.  RTC in : 3 months for f/u of anemia, labs ordered.            HAYLEY ABDUL MD, MPH  Internal Medicine  [facility]  [facility]

## 2024-09-17 NOTE — PATIENT INSTRUCTIONS
Continue follow up with PCP  Continue taking daily multivitamin  You may resume physical activity as tolerated  I am recommending repeat CBC and anemia studies in 3 months  Follow specialists recommendations

## 2024-09-17 NOTE — ASSESSMENT & PLAN NOTE
S/P observation stay due to pneumonia with lung cavity.    Patient is stable, improving.  Patient is on antibiotic

## 2024-09-17 NOTE — ASSESSMENT & PLAN NOTE
Lab Results   Component Value Date    WBC 6.56 09/17/2024    HGB 10.6 (L) 09/17/2024    HCT 32.8 (L) 09/17/2024    MCV 89 09/17/2024     09/17/2024       Unchanged.    Continue oral iron supplementation.    We will reassess in 3 months

## 2024-09-17 NOTE — ASSESSMENT & PLAN NOTE
Clinical and radiological improvement seen today.    Patient is asymptomatic.    Continue scheduled follow-ups with pulmonology and Infectious diseases.    Continue Augmentin and Bactrim.  CT chest scheduled

## 2024-09-20 ENCOUNTER — OFFICE VISIT (OUTPATIENT)
Dept: PULMONOLOGY | Facility: CLINIC | Age: 44
End: 2024-09-20
Payer: COMMERCIAL

## 2024-09-20 VITALS
WEIGHT: 116.63 LBS | SYSTOLIC BLOOD PRESSURE: 102 MMHG | BODY MASS INDEX: 20.66 KG/M2 | OXYGEN SATURATION: 98 % | HEIGHT: 63 IN | HEART RATE: 73 BPM | DIASTOLIC BLOOD PRESSURE: 66 MMHG

## 2024-09-20 DIAGNOSIS — J98.4 PNEUMONIA WITH CAVITY OF LUNG: Primary | ICD-10-CM

## 2024-09-20 DIAGNOSIS — J18.9 PNEUMONIA WITH CAVITY OF LUNG: Primary | ICD-10-CM

## 2024-09-20 PROCEDURE — 99999 PR PBB SHADOW E&M-EST. PATIENT-LVL III: CPT | Mod: PBBFAC,,, | Performed by: STUDENT IN AN ORGANIZED HEALTH CARE EDUCATION/TRAINING PROGRAM

## 2024-09-20 RX ORDER — AZITHROMYCIN 250 MG/1
TABLET, FILM COATED ORAL
COMMUNITY
Start: 2024-06-08

## 2024-09-20 RX ORDER — DIAZEPAM 5 MG/1
TABLET ORAL
COMMUNITY
Start: 2024-06-08

## 2024-09-20 NOTE — PROGRESS NOTES
"[facility]    SUBJECTIVE:     Chief Complaint: Pneumonia, cavitary    History of Present Illness:  Becka Carlson is a 44 y.o. female who presents for hospital follow up of cavitary pneumonia.     She was admitted for observation when a CT chest showed a cavitary lesion in the left lung. She is on week 2 of antibiotics (Augmentin and Bactrim). Symptoms have improved.    Documentation from hospitalization:   "44 yo with cavitary lung lesion consistent with lung abscess. TB less likely given lack of close contacts. She did have contacts with viral URI symptoms so post viral PNA is high on differential. She was splelunking in [place] so consider histo, blasto less likely.     Following vanc/zosyn administration overnight and collection of lab samples for certain infectious disease r/o / work up, discharged to home w/ 6 weeks oral augmentin/Bactrim.  Pulm recs:     In my opinion, she does not need TB r/o and can be discharged with a long course of Abx, would recommend doxycycline and Augmentin for  4-6 weeks based on repeat imaging and clinical course.  Counseled her that this is extremely unlikely to be malignancy as that was one of her concerns and that we will repeat imaging in the future. "    Pneumonia  Patient complains of follow up on pneumonia. Symptoms include:  pleuritic chest pain which is now improved . Symptoms began several days ago, and have resolved since that time. Patient denies cough, dyspnea, chest pain, weight loss, or nausea and vomiting. Treatment thus far includes oral antibiotics per medication orders: effective. Past pulmonary history is significant for no history of pneumonia or bronchitis.      Today: Feels much better, symptoms are essentially resolved. Back to performing all regular activities. She does endorse some dental issues and procedures she underwent as recent as July of this year.       Smoking: no  Other substances: no  Inhalers: no  Travel: [place] and " [place] within the last year  Incarceration/homelessness: No  Occupational/environmental exposures:   Recent illness: Yes, viral symptoms in the end of August  B-Symptoms: not at present  Autoimmune symptoms/features: no  Intubation Hx: no  Hx childhood asthma/atopy: no      Review of patient's allergies indicates:  No Known Allergies    Past Medical History:   Diagnosis Date    Anemia      Past Surgical History:   Procedure Laterality Date    BREAST BIOPSY Left 04/19/2021    TONSILLECTOMY  1983     Family History   Problem Relation Name Age of Onset    Hypertension Father Stephan     Hyperlipidemia Father Stephan     Diabetes Father Stephan     Stroke Father Stephan     Coronary artery disease Father Stephan     Heart attack Father Stephan     Prostate cancer Father Stephan     Cancer Father Stephan     Hyperlipidemia Mother Laura     Coronary artery disease Paternal Grandmother      Cancer Maternal Grandmother [place]         - lung, smoker    Coronary artery disease Maternal Grandfather      Breast cancer Neg Hx      Colon cancer Neg Hx      Ovarian cancer Neg Hx       Social History     Socioeconomic History    Marital status:    Tobacco Use    Smoking status: Never    Smokeless tobacco: Never   Substance and Sexual Activity    Alcohol use: Yes     Alcohol/week: 1.0 standard drink of alcohol     Types: 1 Drinks containing 0.5 oz of alcohol per week     Comment: social     Drug use: No    Sexual activity: Yes     Partners: Male     Birth control/protection: None     Social Determinants of Health     Financial Resource Strain: Low Risk  (9/10/2024)    Overall Financial Resource Strain (CARDIA)     Difficulty of Paying Living Expenses: Not very hard   Food Insecurity: No Food Insecurity (9/10/2024)    Hunger Vital Sign     Worried About Running Out of Food in the Last Year: Never true     Ran Out of Food in the Last Year: Never true   Transportation Needs: No Transportation Needs (9/10/2024)     "TRANSPORTATION NEEDS     Transportation : No   Physical Activity: Insufficiently Active (9/10/2024)    Exercise Vital Sign     Days of Exercise per Week: 3 days     Minutes of Exercise per Session: 30 min   Stress: No Stress Concern Present (9/10/2024)    [employer] - Occupational Stress Questionnaire     Feeling of Stress : Not at all   Housing Stability: Low Risk  (9/10/2024)    Housing Stability Vital Sign     Unable to Pay for Housing in the Last Year: No     Homeless in the Last Year: No       Review of Systems:  ROS  CV: no syncope  ENT: no sore throat  Resp: per hpi  Eyes: no eye pain  Gastrointestinal: no nausea or vomiting  Integument/Breast: no rash  Musculoskeletal: no arthralgias  Neurological: no headaches  Behavioral/Psych: no confusion or depression  Heme: no bleeding      OBJECTIVE:     Vital Signs  Vitals:    09/20/24 1331   BP: 102/66   BP Location: Right arm   Patient Position: Sitting   Pulse: 73   SpO2: 98%   Weight: 52.9 kg (116 lb 10 oz)   Height: 5' 3" (1.6 m)     Body mass index is 20.66 kg/m².    Physical Exam:  Physical Exam  General: no distress  Eyes:  conjunctivae/corneas clear  Nose: no discharge  Neck: trachea midline with no masses appreciated  Lungs:  normal respiratory effort, no wheezes, no rales  Heart: regular rate and rhythm and no murmur  Abdomen: non-distended  Extremities: no cyanosis, no edema, no clubbing  Skin: No rashes or lesions. good skin turgor  Neurologic: alert, oriented, thought content appropriate    Laboratory:  CBC  Lab Results   Component Value Date    WBC 6.56 09/17/2024    HGB 10.6 (L) 09/17/2024    HCT 32.8 (L) 09/17/2024     09/17/2024    MCV 89 09/17/2024    RDW 12.2 09/17/2024     BMP  Lab Results   Component Value Date     09/10/2024    K 4.1 09/10/2024     09/10/2024    CO2 24 09/10/2024    BUN 9 09/10/2024    CREATININE 0.8 09/10/2024     09/10/2024    CALCIUM 9.4 09/10/2024    MG 2.2 09/10/2024 "    PHOS 3.4 09/10/2024     LFTs  Lab Results   Component Value Date    PROT 7.1 09/10/2024    ALBUMIN 2.9 (L) 09/10/2024    BILITOT 0.2 09/10/2024    AST 13 09/10/2024    ALKPHOS 89 09/10/2024    ALT 13 09/10/2024       Chest Imaging, My Impression:     CT Chest from 9/10/24  Cavitary lesion with air fluid level in posterior aspect of left lung.       Diagnostic Results:  Reviewed all laboratory data including cultures from recent hospital admission.     ASSESSMENT/PLAN:     No problems updated.  Problem List Items Addressed This Visit          Pulmonary    Pneumonia with cavity of lung - Primary   - Reviewed all data from recent hospitalization. Microbiological data unrevealing, including Quantiferon gold, AFB smear and culture (NGTD at 11 days); respiratory and fungal cultures.  - Given whole clinical picture, although unable to narrow down specific pathogens, appearance of lesion as a cavity/abscess raises suspicion for oropharynx booker. Did confirm having dental problems and failed root canals.   - Reassuringly, her symptoms are essentially resolved and XR only 1 week after initial CT already shows substantial decrease in size of lesion.   - ID is following and guiding antibiotic therapy. Would continue for a minimum of 4 weeks and repeat CT at 6 weeks from initial.  - Recommend seeing her dentist to discuss our concerns.   - We will review the Chest CT but she can otherwise follow us as PRN.      Mauri Heart MD  Pulmonary & Critical Care Fellow

## 2024-09-25 DIAGNOSIS — J18.9 PNEUMONIA WITH CAVITY OF LUNG: Primary | ICD-10-CM

## 2024-09-25 DIAGNOSIS — J98.4 PNEUMONIA WITH CAVITY OF LUNG: Primary | ICD-10-CM

## 2024-09-25 RX ORDER — AMOXICILLIN AND CLAVULANATE POTASSIUM 875; 125 MG/1; MG/1
1 TABLET, FILM COATED ORAL EVERY 12 HOURS
Qty: 42 TABLET | Refills: 0 | Status: SHIPPED | OUTPATIENT
Start: 2024-09-25 | End: 2024-11-06

## 2024-09-25 RX ORDER — SULFAMETHOXAZOLE AND TRIMETHOPRIM 800; 160 MG/1; MG/1
1 TABLET ORAL 2 TIMES DAILY
Qty: 42 TABLET | Refills: 0 | Status: SHIPPED | OUTPATIENT
Start: 2024-09-25 | End: 2024-10-16

## 2024-09-30 ENCOUNTER — HOSPITAL ENCOUNTER (OUTPATIENT)
Dept: RADIOLOGY | Facility: HOSPITAL | Age: 44
Discharge: HOME | End: 2024-09-30
Payer: COMMERCIAL

## 2024-09-30 DIAGNOSIS — J18.9 PNEUMONIA WITH CAVITY OF LUNG: ICD-10-CM

## 2024-09-30 DIAGNOSIS — J98.4 PNEUMONIA WITH CAVITY OF LUNG: ICD-10-CM

## 2024-09-30 PROCEDURE — 71250 CT THORAX DX C-: CPT | Mod: TC

## 2024-09-30 PROCEDURE — 71250 CT THORAX DX C-: CPT | Mod: 26,,, | Performed by: STUDENT IN AN ORGANIZED HEALTH CARE EDUCATION/TRAINING PROGRAM

## 2024-10-02 ENCOUNTER — PATIENT MESSAGE (OUTPATIENT)
Dept: INTERNAL MEDICINE | Facility: CLINIC | Age: 44
End: 2024-10-02
Payer: COMMERCIAL

## 2024-10-02 DIAGNOSIS — R59.0 AXILLARY LYMPHADENOPATHY: ICD-10-CM

## 2024-10-02 DIAGNOSIS — Z12.31 ENCOUNTER FOR SCREENING MAMMOGRAM FOR MALIGNANT NEOPLASM OF BREAST: Primary | ICD-10-CM

## 2024-10-02 NOTE — TELEPHONE ENCOUNTER
CT Chest Without Contrast  Narrative: EXAMINATION:  CT CHEST WITHOUT CONTRAST    CLINICAL HISTORY:  Pneumonia, unresolved; Pneumonia, unspecified organism    TECHNIQUE:  Axial images, sagittal and coronal reformations were obtained from the thoracic inlet to the lung bases. Contrast was not administered.    COMPARISON:  CT chest 9/9/24    FINDINGS:  LINES/TUBES: None.    SOFT TISSUES: No axillary or supraclavicular lymphadenopathy.  Multiple calcifications in the left posterolateral breast.  The visualized thyroid gland is unremarkable.    HEART AND MEDIASTINUM: No mediastinal or hilar lymphadenopathy. Heart and pericardium are within normal limits. Left-sided aortic arch. The main pulmonary artery is normal in size.    PLEURA: No pleural effusion or pneumothorax.    LUNGS AND AIRWAYS: Airways are patent. Improvement of previously seen wall thick irregular cavitary lesion within the left lower lobe now with thin wall measures 1.9 x 1.7 x 1.4 cm, previously measuring up to 4.4 cm.  Cavitary lesion wall is now air-filled, with thin walls, and with near complete resolution of surrounding ground-glass opacities.  No other cavitary lesions.  No focal consolidations.  No pleural effusions.  No pneumothorax.    UPPER ABDOMEN: No acute abnormality of the partially visualized upper abdomen.  No focal abnormality of the liver, pancreas, or spleen.    BONES: No fracture or focal osseous lesion.  Impression: Significant interval improvement in size of left lower lobe cavitary, air-filled lesion and near complete resolution of surrounding ground-glass opacities.  Residual airfilled cavity with thin wall. Consider repeat CT chest in 6 months to evaluate for changes versus resolution.    Electronically signed by resident: Ramon Zamora  Date:    10/02/2024  Time:    09:27    Electronically signed by: Mariano Chambers  Date:    10/02/2024  Time:    13:32    Significant interval improvement from previous CT  Smaller pulmonary  cavity  Patient is on week 4 of dual ATB therapy, f/u with ID tomorrow.  We will follow ID recommendations.    We will schedule patient's mammogram as well. [facility] mammo (9/2023, BIRADs-2)

## 2024-10-03 ENCOUNTER — OFFICE VISIT (OUTPATIENT)
Dept: INFECTIOUS DISEASES | Facility: CLINIC | Age: 44
End: 2024-10-03
Payer: COMMERCIAL

## 2024-10-03 VITALS
HEART RATE: 71 BPM | BODY MASS INDEX: 20.74 KG/M2 | WEIGHT: 117.06 LBS | SYSTOLIC BLOOD PRESSURE: 98 MMHG | HEIGHT: 63 IN | TEMPERATURE: 98 F | DIASTOLIC BLOOD PRESSURE: 62 MMHG

## 2024-10-03 DIAGNOSIS — J18.9 PNEUMONIA WITH CAVITY OF LUNG: ICD-10-CM

## 2024-10-03 DIAGNOSIS — J98.4 PNEUMONIA WITH CAVITY OF LUNG: ICD-10-CM

## 2024-10-03 PROCEDURE — 1160F RVW MEDS BY RX/DR IN RCRD: CPT | Mod: CPTII,S$GLB,, | Performed by: INTERNAL MEDICINE

## 2024-10-03 PROCEDURE — 1159F MED LIST DOCD IN RCRD: CPT | Mod: CPTII,S$GLB,, | Performed by: INTERNAL MEDICINE

## 2024-10-03 PROCEDURE — 99214 OFFICE O/P EST MOD 30 MIN: CPT | Mod: S$GLB,,, | Performed by: INTERNAL MEDICINE

## 2024-10-03 PROCEDURE — 99999 PR PBB SHADOW E&M-EST. PATIENT-LVL IV: CPT | Mod: PBBFAC,,, | Performed by: INTERNAL MEDICINE

## 2024-10-03 PROCEDURE — 3008F BODY MASS INDEX DOCD: CPT | Mod: CPTII,S$GLB,, | Performed by: INTERNAL MEDICINE

## 2024-10-03 PROCEDURE — 3078F DIAST BP <80 MM HG: CPT | Mod: CPTII,S$GLB,, | Performed by: INTERNAL MEDICINE

## 2024-10-03 PROCEDURE — 3074F SYST BP LT 130 MM HG: CPT | Mod: CPTII,S$GLB,, | Performed by: INTERNAL MEDICINE

## 2024-10-08 ENCOUNTER — HOSPITAL ENCOUNTER (OUTPATIENT)
Dept: RADIOLOGY | Facility: HOSPITAL | Age: 44
Discharge: HOME | End: 2024-10-08
Attending: STUDENT IN AN ORGANIZED HEALTH CARE EDUCATION/TRAINING PROGRAM
Payer: COMMERCIAL

## 2024-10-08 DIAGNOSIS — R59.0 AXILLARY LYMPHADENOPATHY: ICD-10-CM

## 2024-10-08 DIAGNOSIS — Z12.31 ENCOUNTER FOR SCREENING MAMMOGRAM FOR MALIGNANT NEOPLASM OF BREAST: ICD-10-CM

## 2024-10-08 PROCEDURE — 77066 DX MAMMO INCL CAD BI: CPT | Mod: 26,,, | Performed by: RADIOLOGY

## 2024-10-08 PROCEDURE — 77066 DX MAMMO INCL CAD BI: CPT | Mod: TC

## 2024-10-08 PROCEDURE — 77062 BREAST TOMOSYNTHESIS BI: CPT | Mod: TC

## 2024-10-08 PROCEDURE — 77062 BREAST TOMOSYNTHESIS BI: CPT | Mod: 26,,, | Performed by: RADIOLOGY

## 2024-10-09 DIAGNOSIS — R92.30 DENSE BREAST: Primary | ICD-10-CM

## 2024-10-10 ENCOUNTER — PATIENT MESSAGE (OUTPATIENT)
Dept: INFECTIOUS DISEASES | Facility: CLINIC | Age: 44
End: 2024-10-10
Payer: COMMERCIAL

## 2024-10-12 NOTE — PROGRESS NOTES
Subjective     Patient ID: Becka Carlson is a 44 y.o. female.    Chief Complaint:Follow-up      History of Present Illness    44 year old female who was recently admitted to the hospital with cavitary pneumonia of the lung.  She was discharged from the hospital on bactrim and augmentin with plans for follow up CT scan and clinic visit.  In the interim, she had a CT scan done that showed a decrease in size of the lung cavity.  Her PCP extended her course of bactrim and augmentin.  She is her today for follow up.    Review of Systems   All other systems reviewed and are negative.       Objective   Physical Exam  Vitals and nursing note reviewed.   Constitutional:       General: She is not in acute distress.     Appearance: She is well-developed. She is not diaphoretic.   HENT:      Head: Normocephalic and atraumatic.      Right Ear: External ear normal.      Left Ear: External ear normal.      Nose: Nose normal.      Mouth/Throat:      Pharynx: No oropharyngeal exudate.   Eyes:      General: No scleral icterus.        Right eye: No discharge.         Left eye: No discharge.      Conjunctiva/sclera: Conjunctivae normal.      Pupils: Pupils are equal, round, and reactive to light.   Neck:      Thyroid: No thyromegaly.      Vascular: No JVD.      Trachea: No tracheal deviation.   Cardiovascular:      Rate and Rhythm: Normal rate and regular rhythm.      Heart sounds: No murmur heard.     No friction rub. No gallop.   Pulmonary:      Effort: Pulmonary effort is normal. No respiratory distress.      Breath sounds: Normal breath sounds. No stridor. No wheezing or rales.   Chest:      Chest wall: No tenderness.   Abdominal:      General: Bowel sounds are normal. There is no distension.      Palpations: Abdomen is soft. There is no mass.      Tenderness: There is no abdominal tenderness. There is no guarding or rebound.   Musculoskeletal:         General: No tenderness. Normal range of motion.      Cervical back:  Normal range of motion and neck supple.   Lymphadenopathy:      Cervical: No cervical adenopathy.   Skin:     General: Skin is warm.      Coloration: Skin is not pale.      Findings: No erythema or rash.   Neurological:      Mental Status: She is alert and oriented to person, place, and time.      Cranial Nerves: No cranial nerve deficit.      Motor: No abnormal muscle tone.      Coordination: Coordination normal.      Deep Tendon Reflexes: Reflexes normal.   Psychiatric:         Behavior: Behavior normal.         Thought Content: Thought content normal.         Judgment: Judgment normal.            Assessment and Plan     1. Pneumonia with cavity of lung      44 year old female with cavitary lung disease.  She has been on bactrim and augmentin for just over 3 weeks.  Follow up CT scan shows a decrease in the size of the cavity.  She is to continue on bactrim and augmentin for another 3 weeks.  Will order a follow up CT scan to verify continued resolution.    Plan:  Pneumonia with cavity of lung  -     Ambulatory referral/consult to Infectious Disease  -     CT Chest Without Contrast; Future; Expected date: 10/03/2024

## 2024-10-15 ENCOUNTER — TELEPHONE (OUTPATIENT)
Dept: INTERNAL MEDICINE | Facility: CLINIC | Age: 44
End: 2024-10-15
Payer: COMMERCIAL

## 2024-10-15 DIAGNOSIS — J98.4 PNEUMONIA WITH CAVITY OF LUNG: Primary | ICD-10-CM

## 2024-10-15 DIAGNOSIS — J18.9 PNEUMONIA WITH CAVITY OF LUNG: Primary | ICD-10-CM

## 2024-10-15 PROBLEM — R07.81 PLEURITIC CHEST PAIN: Status: RESOLVED | Noted: 2024-09-09 | Resolved: 2024-10-15

## 2024-10-15 NOTE — TELEPHONE ENCOUNTER
I have personally called patient to follow-up on her most recent appointments and specialist's recommendations.    Patient states she is doing much better, she does not have shortness for breath at rest or with exertion.    She is back to her regular activities without restrictions.    She has not off antibiotics to complete the 6 weeks recommended by Infectious diseases and pulmonology.    She already has orders for follow-up CT scan, which was scheduled for January.  As per Infectious diseases and pulmonology they are okay with the timeline for the follow-up.      At this time I will schedule her follow-up labs for anemia in January as well.    I have recommended to continue taking daily multivitamin, probiotic, emphasized in good nutrition.      Patient stated she will be traveling to [place] in a few weeks.  We have discussed the possibility of taking prophylactic antibiotic in case of upper respiratory infection.    No other concerns at this time

## 2024-10-23 ENCOUNTER — OCCUPATIONAL HEALTH (OUTPATIENT)
Dept: URGENT CARE | Facility: CLINIC | Age: 44
End: 2024-10-23

## 2024-10-23 PROCEDURE — 90686 IIV4 VACC NO PRSV 0.5 ML IM: CPT | Mod: S$GLB,,, | Performed by: EMERGENCY MEDICINE

## 2024-10-24 NOTE — PROGRESS NOTES
OBSTETRICS AND GYNECOLOGY    Chief Complaint:  Well Woman Exam     HPI:      Becka Carlson is a 44 y.o.  who presents today for well woman exam.    LMP: Patient's last menstrual period was 10/24/2024. Specifically, patient denies abnormal vaginal bleeding, abnormal discharge/odor, pelvic pain, or dysuria/hematuria. Ms. Carlson is currently sexually active with a single male partner. She is currently using no method for contraception. She declines STD screening today. She denies additional issues, problems, or complaints.     Ms. Carlson confirms that she wears her seatbelt when riding in the car.     OB History          0    Para   0    Term   0       0    AB   0    Living   0         SAB   0    IAB   0    Ectopic   0    Multiple   0    Live Births   0           Obstetric Comments   Menarche 12             ROS:     GENERAL: Feeling well overall.   BREASTS: Denies breast skin changes, lumps or nipple discharge.   URINARY: Denies dysuria, hematuria.    Physical Exam:      PHYSICAL EXAM:  /80   Wt 56.7 kg (125 lb)   LMP 10/24/2024   BMI 22.14 kg/m²   Body mass index is 22.14 kg/m².     APPEARANCE:  Well nourished, well developed, in no acute distress.  Able to smile appropriately during our encounter. Makes eye contact. Pleasant.  PSYCH: Appropriate mood and affect.  SKIN:   No acne or hirsutism.  CARDIOVASCULAR:  No edema of peripheral extremities. Well perfused throughout.  RESP:  No accessory muscle use to breathe. Speaking comfortably in complete sentences.   BREASTS:  Symmetrical, with no visible skin lesions or scars, no palpable masses. No nipple discharge or peau d'orange bilaterally. No palpable axillary LAD.  ABDOMEN:  Soft. Nonacute.    PELVIC:  Normal external genitalia without lesions. Normal hair distribution. Adequate perineal body, normal urethral meatus. Vagina moist and well rugated. Without lesions, without discharge. Cervix 3x4cm, nulliparous. Cervix pink,  without ectocervical lesions, discharge or tenderness.  No ectropion. No significant cystocele or rectocele.  Bimanual exam shows uterus to be normal size, regular, mobile and nontender.  Adnexa without masses or tenderness.      Female chaperone present.    Assessment/Plan:     Well Woman Exam  -- Counseled patient regarding healthy diet and regular exercise, daily seat belt use.   -- BP normotensive  -- She denies abuse and feels safe at home.  -- Pap smear:  obtained  (4/2021): NILM, HPV(-)   -- MMG:  UTD, WNL; previous biopsy markers  -- Contraception:  aware of pregnancy possibility  -- STD screening:  declines   -- Colonoscopy (2022):  WNL per patient (done at [facility]), 5yrs repeat (family history of polyps, not malignancy)     Follow up in about 1 year (around 11/5/2025) for WWE.    Counseling:     Patient was counseled today on current ASCCP pap guidelines, the recommendation for yearly physical exams, safe driving habits, and breast self awareness. She is to see her PCP for other health maintenance.     Use of the [facility] Patient Portal discussed and encouraged during today's visit.                 As of April 1, 2021, the Cures Act has been passed nationally. This new law requires that all doctors progress notes, lab results, pathology reports and radiology reports be released IMMEDIATELY to the patient in the patient portal. That means that the results are released to you at the EXACT same time they are released to me. Therefore, with all of the patients that I have I am not able to reply to each patient exactly when the results come in. So there will be a delay from when you see the results to when I see them and have time to come up with a response to send you. Also I only see these results when I am on the computer at work. So if the results come in over the weekend or after 5 pm of a work day, I will not see them until the next business day. As you can tell, this is a challenge as a physician to give  every patient the quick response they hope for and deserve. So please be patient!   Thanks for your understanding and patience.

## 2024-11-03 ENCOUNTER — PATIENT MESSAGE (OUTPATIENT)
Dept: INTERNAL MEDICINE | Facility: CLINIC | Age: 44
End: 2024-11-03
Payer: COMMERCIAL

## 2024-11-03 DIAGNOSIS — Z91.89 AT RISK FOR INFECTIOUS DISEASE DUE TO RECENT FOREIGN TRAVEL: Primary | ICD-10-CM

## 2024-11-05 ENCOUNTER — OFFICE VISIT (OUTPATIENT)
Dept: OBSTETRICS AND GYNECOLOGY | Facility: CLINIC | Age: 44
End: 2024-11-05
Payer: COMMERCIAL

## 2024-11-05 VITALS — SYSTOLIC BLOOD PRESSURE: 124 MMHG | DIASTOLIC BLOOD PRESSURE: 80 MMHG | WEIGHT: 125 LBS | BODY MASS INDEX: 22.14 KG/M2

## 2024-11-05 DIAGNOSIS — Z11.51 ENCOUNTER FOR SCREENING FOR HUMAN PAPILLOMAVIRUS (HPV): ICD-10-CM

## 2024-11-05 DIAGNOSIS — Z01.419 ENCOUNTER FOR WELL WOMAN EXAM: Primary | ICD-10-CM

## 2024-11-05 DIAGNOSIS — Z12.4 ENCOUNTER FOR PAPANICOLAOU SMEAR FOR CERVICAL CANCER SCREENING: ICD-10-CM

## 2024-11-05 PROCEDURE — 99396 PREV VISIT EST AGE 40-64: CPT | Mod: S$GLB,,, | Performed by: STUDENT IN AN ORGANIZED HEALTH CARE EDUCATION/TRAINING PROGRAM

## 2024-11-05 PROCEDURE — 3079F DIAST BP 80-89 MM HG: CPT | Mod: CPTII,S$GLB,, | Performed by: STUDENT IN AN ORGANIZED HEALTH CARE EDUCATION/TRAINING PROGRAM

## 2024-11-05 PROCEDURE — 99999 PR PBB SHADOW E&M-EST. PATIENT-LVL III: CPT | Mod: PBBFAC,,, | Performed by: STUDENT IN AN ORGANIZED HEALTH CARE EDUCATION/TRAINING PROGRAM

## 2024-11-05 PROCEDURE — 3008F BODY MASS INDEX DOCD: CPT | Mod: CPTII,S$GLB,, | Performed by: STUDENT IN AN ORGANIZED HEALTH CARE EDUCATION/TRAINING PROGRAM

## 2024-11-05 PROCEDURE — 1159F MED LIST DOCD IN RCRD: CPT | Mod: CPTII,S$GLB,, | Performed by: STUDENT IN AN ORGANIZED HEALTH CARE EDUCATION/TRAINING PROGRAM

## 2024-11-05 PROCEDURE — 3074F SYST BP LT 130 MM HG: CPT | Mod: CPTII,S$GLB,, | Performed by: STUDENT IN AN ORGANIZED HEALTH CARE EDUCATION/TRAINING PROGRAM

## 2024-11-06 RX ORDER — AZITHROMYCIN 250 MG/1
TABLET, FILM COATED ORAL
Qty: 6 TABLET | Refills: 0 | Status: SHIPPED | OUTPATIENT
Start: 2024-11-06 | End: 2024-11-11

## 2024-11-06 NOTE — TELEPHONE ENCOUNTER
Patient has requested prescription for prophylactic antibiotic before overseas trip.  I am prescribing Z-Jerardo in case of traveler's diarrhea or complicated upper respiratory infection.    I have sent instructions to the patient.      In addition, have communicated to the patient the recommendations by the Infectious diseases specialist as per the prophylactic antibiotic before dental procedure (amoxicillin).

## 2024-11-09 LAB
FINAL PATHOLOGIC DIAGNOSIS: NORMAL
Lab: NORMAL

## 2025-01-03 ENCOUNTER — HOSPITAL ENCOUNTER (OUTPATIENT)
Dept: RADIOLOGY | Facility: HOSPITAL | Age: 45
Discharge: HOME | End: 2025-01-03
Attending: INTERNAL MEDICINE
Payer: COMMERCIAL

## 2025-01-03 DIAGNOSIS — J98.4 PNEUMONIA WITH CAVITY OF LUNG: ICD-10-CM

## 2025-01-03 DIAGNOSIS — J18.9 PNEUMONIA WITH CAVITY OF LUNG: ICD-10-CM

## 2025-01-03 PROCEDURE — 71250 CT THORAX DX C-: CPT | Mod: 26,,, | Performed by: RADIOLOGY

## 2025-01-03 PROCEDURE — 71250 CT THORAX DX C-: CPT | Mod: TC

## 2025-01-27 ENCOUNTER — TELEPHONE (OUTPATIENT)
Dept: INFECTIOUS DISEASES | Facility: CLINIC | Age: 45
End: 2025-01-27
Payer: COMMERCIAL

## 2025-01-27 ENCOUNTER — PATIENT MESSAGE (OUTPATIENT)
Dept: INFECTIOUS DISEASES | Facility: CLINIC | Age: 45
End: 2025-01-27
Payer: COMMERCIAL

## 2025-01-27 DIAGNOSIS — R91.1 SOLITARY PULMONARY NODULE: Primary | ICD-10-CM

## 2025-01-27 DIAGNOSIS — J18.9 PNEUMONIA WITH CAVITY OF LUNG: ICD-10-CM

## 2025-01-27 DIAGNOSIS — J98.4 PNEUMONIA WITH CAVITY OF LUNG: ICD-10-CM

## 2025-05-13 ENCOUNTER — TELEPHONE (OUTPATIENT)
Dept: INTERNAL MEDICINE | Facility: CLINIC | Age: 45
End: 2025-05-13
Payer: COMMERCIAL

## 2025-05-16 ENCOUNTER — PATIENT MESSAGE (OUTPATIENT)
Dept: INTERNAL MEDICINE | Facility: CLINIC | Age: 45
End: 2025-05-16
Payer: COMMERCIAL

## 2025-05-16 RX ORDER — MUPIROCIN 20 MG/G
OINTMENT TOPICAL 2 TIMES DAILY
Qty: 1 EACH | Refills: 0 | Status: SHIPPED | OUTPATIENT
Start: 2025-05-16 | End: 2025-05-21

## 2025-07-07 ENCOUNTER — HOSPITAL ENCOUNTER (OUTPATIENT)
Dept: RADIOLOGY | Facility: HOSPITAL | Age: 45
Discharge: HOME | End: 2025-07-07
Attending: INTERNAL MEDICINE
Payer: COMMERCIAL

## 2025-07-07 DIAGNOSIS — J98.4 PNEUMONIA WITH CAVITY OF LUNG: ICD-10-CM

## 2025-07-07 DIAGNOSIS — J18.9 PNEUMONIA WITH CAVITY OF LUNG: ICD-10-CM

## 2025-07-07 DIAGNOSIS — R91.1 SOLITARY PULMONARY NODULE: ICD-10-CM

## 2025-07-07 PROCEDURE — 71250 CT THORAX DX C-: CPT | Mod: 26,,, | Performed by: STUDENT IN AN ORGANIZED HEALTH CARE EDUCATION/TRAINING PROGRAM

## 2025-07-07 PROCEDURE — 71250 CT THORAX DX C-: CPT | Mod: TC

## 2025-07-11 ENCOUNTER — PATIENT MESSAGE (OUTPATIENT)
Dept: INFECTIOUS DISEASES | Facility: CLINIC | Age: 45
End: 2025-07-11
Payer: COMMERCIAL